# Patient Record
Sex: FEMALE | Race: BLACK OR AFRICAN AMERICAN | NOT HISPANIC OR LATINO | Employment: FULL TIME | URBAN - METROPOLITAN AREA
[De-identification: names, ages, dates, MRNs, and addresses within clinical notes are randomized per-mention and may not be internally consistent; named-entity substitution may affect disease eponyms.]

---

## 2023-05-12 ENCOUNTER — HOSPITAL ENCOUNTER (OUTPATIENT)
Dept: DATA CONVERSION | Facility: HOSPITAL | Age: 48
End: 2023-05-12
Attending: INTERNAL MEDICINE | Admitting: INTERNAL MEDICINE
Payer: COMMERCIAL

## 2023-05-12 DIAGNOSIS — Z12.11 ENCOUNTER FOR SCREENING FOR MALIGNANT NEOPLASM OF COLON: ICD-10-CM

## 2023-05-12 DIAGNOSIS — G43.909 MIGRAINE, UNSPECIFIED, NOT INTRACTABLE, WITHOUT STATUS MIGRAINOSUS: ICD-10-CM

## 2023-05-12 DIAGNOSIS — R10.13 EPIGASTRIC PAIN: ICD-10-CM

## 2023-05-12 DIAGNOSIS — Z91.040 LATEX ALLERGY STATUS: ICD-10-CM

## 2023-05-12 DIAGNOSIS — I10 ESSENTIAL (PRIMARY) HYPERTENSION: ICD-10-CM

## 2023-05-12 DIAGNOSIS — K76.0 FATTY (CHANGE OF) LIVER, NOT ELSEWHERE CLASSIFIED: ICD-10-CM

## 2023-05-12 DIAGNOSIS — K31.9 DISEASE OF STOMACH AND DUODENUM, UNSPECIFIED: ICD-10-CM

## 2023-05-12 DIAGNOSIS — G40.909 EPILEPSY, UNSPECIFIED, NOT INTRACTABLE, WITHOUT STATUS EPILEPTICUS (MULTI): ICD-10-CM

## 2023-05-12 DIAGNOSIS — K57.30 DIVERTICULOSIS OF LARGE INTESTINE WITHOUT PERFORATION OR ABSCESS WITHOUT BLEEDING: ICD-10-CM

## 2023-05-18 LAB
COMPLETE PATHOLOGY REPORT: NORMAL
CONVERTED CLINICAL DIAGNOSIS-HISTORY: NORMAL
CONVERTED FINAL DIAGNOSIS: NORMAL
CONVERTED FINAL REPORT PDF LINK TO COPY AND PASTE: NORMAL
CONVERTED GROSS DESCRIPTION: NORMAL

## 2023-06-14 ENCOUNTER — OFFICE VISIT (OUTPATIENT)
Dept: PRIMARY CARE | Facility: CLINIC | Age: 48
End: 2023-06-14
Payer: COMMERCIAL

## 2023-06-14 ENCOUNTER — APPOINTMENT (OUTPATIENT)
Dept: PRIMARY CARE | Facility: CLINIC | Age: 48
End: 2023-06-14
Payer: COMMERCIAL

## 2023-06-14 VITALS
DIASTOLIC BLOOD PRESSURE: 82 MMHG | TEMPERATURE: 97.9 F | SYSTOLIC BLOOD PRESSURE: 124 MMHG | WEIGHT: 210 LBS | BODY MASS INDEX: 29.71 KG/M2

## 2023-06-14 DIAGNOSIS — Z00.00 ROUTINE GENERAL MEDICAL EXAMINATION AT A HEALTH CARE FACILITY: Primary | ICD-10-CM

## 2023-06-14 DIAGNOSIS — Z14.8 HEMOCHROMATOSIS CARRIER: ICD-10-CM

## 2023-06-14 PROBLEM — K76.0 FATTY INFILTRATION OF LIVER: Status: ACTIVE | Noted: 2023-06-14

## 2023-06-14 PROCEDURE — 1036F TOBACCO NON-USER: CPT | Performed by: INTERNAL MEDICINE

## 2023-06-14 PROCEDURE — 99213 OFFICE O/P EST LOW 20 MIN: CPT | Performed by: INTERNAL MEDICINE

## 2023-06-14 RX ORDER — MINOXIDIL 50 MG/G
AEROSOL, FOAM TOPICAL AS NEEDED
COMMUNITY
Start: 2022-02-04 | End: 2024-05-31 | Stop reason: ALTCHOICE

## 2023-06-14 RX ORDER — CETIRIZINE HYDROCHLORIDE 10 MG/1
10 TABLET ORAL DAILY PRN
COMMUNITY
Start: 2022-12-30 | End: 2024-05-31 | Stop reason: ALTCHOICE

## 2023-06-14 RX ORDER — BIOTIN 1000 MCG
1 TABLET,CHEWABLE ORAL
COMMUNITY
End: 2023-10-16 | Stop reason: WASHOUT

## 2023-06-14 RX ORDER — LEVETIRACETAM 500 MG/1
1 TABLET ORAL 2 TIMES DAILY
COMMUNITY
Start: 2016-09-28

## 2023-06-14 NOTE — PROGRESS NOTES
Subjective   Patient ID: Mirna De La Vega is a 48 y.o. female who presents for 4 month fu.    HPI   Overall well   #1 elevated Cr- no edema.   Good UO    #2 polycythemia- s/p heme eval 2019.    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     #4 fhx DVT- father. s/p heme eval  #5 fatty liver- reviewed. diet/exercise. no EtOH   #6 dyspepsia- mild, low grade.  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      Review of Systems   All other systems reviewed and are negative.      Objective   /82   Temp 36.6 °C (97.9 °F)   Wt 95.3 kg (210 lb)   BMI 29.71 kg/m²     Physical Exam  Constitutional:       Appearance: Normal appearance.   Cardiovascular:      Rate and Rhythm: Normal rate and regular rhythm.      Pulses: Normal pulses.      Heart sounds: Normal heart sounds. No murmur heard.  Pulmonary:      Effort: Pulmonary effort is normal. No respiratory distress.      Breath sounds: Normal breath sounds. No wheezing, rhonchi or rales.   Neurological:      Mental Status: She is alert.   Psychiatric:         Mood and Affect: Mood normal.         Behavior: Behavior normal.         Thought Content: Thought content normal.         Judgment: Judgment normal.       Lab Results   Component Value Date    WBC 6.6 03/17/2022    HGB 14.1 03/17/2022    HCT 46.6 (H) 03/17/2022     03/17/2022    CHOL 154 08/08/2020    TRIG 110 08/08/2020    HDL 49.8 08/08/2020    ALT 18 03/17/2022    AST 19 03/17/2022     03/17/2022    K 3.9 03/17/2022     03/17/2022    CREATININE 1.04 03/17/2022    BUN 12 03/17/2022    CO2 31 03/17/2022    TSH 1.04 03/17/2022     Lab Results   Component Value Date    IRON 52 03/16/2021    TIBC 305 03/16/2021    FERRITIN 47 03/16/2021             Assessment/Plan     #1 elevated Cr-  reviewed.  Stable will  #2 polycythemia- s/p heme eval 2019. 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 retest iron    #4 fhx DVT- father. s/p heme eval  #5 fatty liver- reviewed. diet/exercise. no EtOH   #6 dyspepsia- f/u  GI

## 2023-07-07 ENCOUNTER — OFFICE VISIT (OUTPATIENT)
Dept: PRIMARY CARE | Facility: CLINIC | Age: 48
End: 2023-07-07
Payer: COMMERCIAL

## 2023-07-07 VITALS
TEMPERATURE: 98.1 F | WEIGHT: 210.4 LBS | SYSTOLIC BLOOD PRESSURE: 116 MMHG | BODY MASS INDEX: 29.76 KG/M2 | DIASTOLIC BLOOD PRESSURE: 72 MMHG

## 2023-07-07 DIAGNOSIS — R10.33 PERIUMBILICAL ABDOMINAL PAIN: Primary | ICD-10-CM

## 2023-07-07 PROCEDURE — 99213 OFFICE O/P EST LOW 20 MIN: CPT | Performed by: INTERNAL MEDICINE

## 2023-07-07 PROCEDURE — 1036F TOBACCO NON-USER: CPT | Performed by: INTERNAL MEDICINE
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
## 2023-07-12 ENCOUNTER — OFFICE VISIT (OUTPATIENT)
Dept: PRIMARY CARE | Facility: CLINIC | Age: 48
End: 2023-07-12
Payer: COMMERCIAL

## 2023-07-12 VITALS
TEMPERATURE: 97.3 F | DIASTOLIC BLOOD PRESSURE: 80 MMHG | SYSTOLIC BLOOD PRESSURE: 120 MMHG | WEIGHT: 208 LBS | BODY MASS INDEX: 29.42 KG/M2

## 2023-07-12 DIAGNOSIS — H61.23 IMPACTED CERUMEN OF BOTH EARS: Primary | ICD-10-CM

## 2023-07-12 DIAGNOSIS — H66.90 EAR INFECTION: ICD-10-CM

## 2023-07-12 PROCEDURE — 1036F TOBACCO NON-USER: CPT | Performed by: INTERNAL MEDICINE

## 2023-07-12 PROCEDURE — 69209 REMOVE IMPACTED EAR WAX UNI: CPT | Performed by: INTERNAL MEDICINE

## 2023-07-12 PROCEDURE — 99213 OFFICE O/P EST LOW 20 MIN: CPT | Performed by: INTERNAL MEDICINE

## 2023-07-12 RX ORDER — OFLOXACIN 3 MG/ML
5 SOLUTION AURICULAR (OTIC) 2 TIMES DAILY
Qty: 10 ML | Refills: 0 | Status: SHIPPED | OUTPATIENT
Start: 2023-07-12 | End: 2023-10-17 | Stop reason: ALTCHOICE

## 2023-07-12 ASSESSMENT — PATIENT HEALTH QUESTIONNAIRE - PHQ9
1. LITTLE INTEREST OR PLEASURE IN DOING THINGS: NOT AT ALL
SUM OF ALL RESPONSES TO PHQ9 QUESTIONS 1 AND 2: 0
2. FEELING DOWN, DEPRESSED OR HOPELESS: NOT AT ALL

## 2023-07-12 NOTE — PROGRESS NOTES
Subjective   Patient ID: [id number]   [facility]    Mirna De La Vega is a 48 y.o. female who presents for Ear Fullness (Right ear,decrease in hearing, discomfort noted) and Headache.it started today @4 am. She denies any discharge from the both ears.        Objective   Visit Vitals  /80 (BP Location: Left arm, Patient Position: Sitting, BP Cuff Size: Adult)   Temp 36.3 °C (97.3 °F) (Temporal)      Review of Systems  All 12 systems reviewed, no other abnormality except that mentioned in HPI.    Physical Exam  Constitutional- no abnormality  ENT-impacted cerumen both ears.  Neck- no swelling  CVS- normal S1 and S2, no murmur.  Pulmonary- clear to auscultation,no rhonchi, no wheezes.  Abdomen- normal- liver and spleen, soft, no distension, bowel sound present.  Neurological- all cranial nerves intact, speech and gait normal, no sensory or motor deficiency.  Musculoskeletal- all pulses are normal, normal movement, no joint swelling.  Skin- no rash, dry.  psychiatry- no suicidal ideation.    Lymph node- no lyphadenopathy      Assessment/Plan   Problem List Items Addressed This Visit       Impacted cerumen of both ears - Primary     Irrigation of both ears done.          Other Visit Diagnoses       Ear infection        Relevant Medications    ofloxacin (Floxin) 0.3 % otic solution            Jennifer Benjamin MD

## 2023-07-12 NOTE — PROGRESS NOTES
Patient ID: Mirna De La Vega is a 48 y.o. female.    Ear Cerumen Removal    Date/Time: 7/12/2023 2:27 PM    Performed by: Jennifer Benjamin MD  Authorized by: Jennifer Benjamin MD    Consent:     Consent obtained:  Verbal    Consent given by:  Patient    Risks, benefits, and alternatives were discussed: yes      Risks discussed:  Bleeding, infection and pain    Alternatives discussed:  Delayed treatment  Universal protocol:     Procedure explained and questions answered to patient or proxy's satisfaction: yes      Immediately prior to procedure, a time out was called: yes      Patient identity confirmed:  Verbally with patient  Procedure details:     Location:  L ear and R ear    Procedure type: irrigation      Procedure outcomes: cerumen removed    Post-procedure details:     Inspection:  Ear canal clear    Hearing quality:  Improved    Procedure completion:  Tolerated

## 2023-07-14 ENCOUNTER — TELEPHONE (OUTPATIENT)
Dept: PRIMARY CARE | Facility: CLINIC | Age: 48
End: 2023-07-14
Payer: COMMERCIAL

## 2023-07-27 ENCOUNTER — LAB (OUTPATIENT)
Dept: LAB | Facility: LAB | Age: 48
End: 2023-07-27
Payer: COMMERCIAL

## 2023-07-27 DIAGNOSIS — Z14.8 HEMOCHROMATOSIS CARRIER: ICD-10-CM

## 2023-07-27 DIAGNOSIS — Z00.00 ROUTINE GENERAL MEDICAL EXAMINATION AT A HEALTH CARE FACILITY: ICD-10-CM

## 2023-07-27 LAB
ALANINE AMINOTRANSFERASE (SGPT) (U/L) IN SER/PLAS: 26 U/L (ref 7–45)
ANION GAP IN SER/PLAS: 12 MMOL/L (ref 10–20)
APPEARANCE, URINE: CLEAR
BILIRUBIN, URINE: NEGATIVE
BLOOD, URINE: NEGATIVE
CALCIUM (MG/DL) IN SER/PLAS: 9.3 MG/DL (ref 8.6–10.3)
CARBON DIOXIDE, TOTAL (MMOL/L) IN SER/PLAS: 29 MMOL/L (ref 21–32)
CHLORIDE (MMOL/L) IN SER/PLAS: 102 MMOL/L (ref 98–107)
CHOLESTEROL (MG/DL) IN SER/PLAS: 150 MG/DL (ref 0–199)
CHOLESTEROL IN HDL (MG/DL) IN SER/PLAS: 51.3 MG/DL
CHOLESTEROL/HDL RATIO: 2.9
COLOR, URINE: COLORLESS
CREATININE (MG/DL) IN SER/PLAS: 1.07 MG/DL (ref 0.5–1.05)
ERYTHROCYTE DISTRIBUTION WIDTH (RATIO) BY AUTOMATED COUNT: 14.3 % (ref 11.5–14.5)
ERYTHROCYTE MEAN CORPUSCULAR HEMOGLOBIN CONCENTRATION (G/DL) BY AUTOMATED: 30.6 G/DL (ref 32–36)
ERYTHROCYTE MEAN CORPUSCULAR VOLUME (FL) BY AUTOMATED COUNT: 94 FL (ref 80–100)
ERYTHROCYTES (10*6/UL) IN BLOOD BY AUTOMATED COUNT: 5.1 X10E12/L (ref 4–5.2)
FERRITIN (UG/LL) IN SER/PLAS: 112 UG/L (ref 8–150)
GFR FEMALE: 65 ML/MIN/1.73M2
GLUCOSE (MG/DL) IN SER/PLAS: 68 MG/DL (ref 74–99)
GLUCOSE, URINE: NEGATIVE MG/DL
HEMATOCRIT (%) IN BLOOD BY AUTOMATED COUNT: 48.1 % (ref 36–46)
HEMOGLOBIN (G/DL) IN BLOOD: 14.7 G/DL (ref 12–16)
IRON (UG/DL) IN SER/PLAS: 63 UG/DL (ref 35–150)
IRON BINDING CAPACITY (UG/DL) IN SER/PLAS: 311 UG/DL (ref 240–445)
IRON SATURATION (%) IN SER/PLAS: 20 % (ref 25–45)
KETONES, URINE: NEGATIVE MG/DL
LDL: 79 MG/DL (ref 0–99)
LEUKOCYTE ESTERASE, URINE: NEGATIVE
LEUKOCYTES (10*3/UL) IN BLOOD BY AUTOMATED COUNT: 7.6 X10E9/L (ref 4.4–11.3)
NITRITE, URINE: NEGATIVE
PH, URINE: 7 (ref 5–8)
PLATELETS (10*3/UL) IN BLOOD AUTOMATED COUNT: 320 X10E9/L (ref 150–450)
POTASSIUM (MMOL/L) IN SER/PLAS: 4.1 MMOL/L (ref 3.5–5.3)
PROTEIN, URINE: NEGATIVE MG/DL
SODIUM (MMOL/L) IN SER/PLAS: 139 MMOL/L (ref 136–145)
SPECIFIC GRAVITY, URINE: 1 (ref 1–1.03)
THYROTROPIN (MIU/L) IN SER/PLAS BY DETECTION LIMIT <= 0.05 MIU/L: 1.06 MIU/L (ref 0.44–3.98)
TRIGLYCERIDE (MG/DL) IN SER/PLAS: 100 MG/DL (ref 0–149)
UREA NITROGEN (MG/DL) IN SER/PLAS: 13 MG/DL (ref 6–23)
UROBILINOGEN, URINE: <2 MG/DL (ref 0–1.9)
VLDL: 20 MG/DL (ref 0–40)

## 2023-07-27 PROCEDURE — 83540 ASSAY OF IRON: CPT

## 2023-07-27 PROCEDURE — 80061 LIPID PANEL: CPT

## 2023-07-27 PROCEDURE — 36415 COLL VENOUS BLD VENIPUNCTURE: CPT

## 2023-07-27 PROCEDURE — 80048 BASIC METABOLIC PNL TOTAL CA: CPT

## 2023-07-27 PROCEDURE — 84443 ASSAY THYROID STIM HORMONE: CPT

## 2023-07-27 PROCEDURE — 81003 URINALYSIS AUTO W/O SCOPE: CPT

## 2023-07-27 PROCEDURE — 82728 ASSAY OF FERRITIN: CPT

## 2023-07-27 PROCEDURE — 84460 ALANINE AMINO (ALT) (SGPT): CPT

## 2023-07-27 PROCEDURE — 83036 HEMOGLOBIN GLYCOSYLATED A1C: CPT

## 2023-07-27 PROCEDURE — 83550 IRON BINDING TEST: CPT

## 2023-07-27 PROCEDURE — 85027 COMPLETE CBC AUTOMATED: CPT

## 2023-07-28 LAB
ESTIMATED AVERAGE GLUCOSE FOR HBA1C: 140 MG/DL
HEMOGLOBIN A1C/HEMOGLOBIN TOTAL IN BLOOD: 6.5 %

## 2023-08-08 ENCOUNTER — APPOINTMENT (OUTPATIENT)
Dept: PRIMARY CARE | Facility: CLINIC | Age: 48
End: 2023-08-08
Payer: COMMERCIAL

## 2023-08-23 ENCOUNTER — OFFICE VISIT (OUTPATIENT)
Dept: PRIMARY CARE | Facility: CLINIC | Age: 48
End: 2023-08-23
Payer: COMMERCIAL

## 2023-08-23 VITALS — DIASTOLIC BLOOD PRESSURE: 78 MMHG | SYSTOLIC BLOOD PRESSURE: 124 MMHG | BODY MASS INDEX: 29.42 KG/M2 | WEIGHT: 208 LBS

## 2023-08-23 DIAGNOSIS — K76.0 FATTY INFILTRATION OF LIVER: ICD-10-CM

## 2023-08-23 DIAGNOSIS — E11.9 TYPE 2 DIABETES MELLITUS WITHOUT COMPLICATION, WITHOUT LONG-TERM CURRENT USE OF INSULIN (MULTI): Primary | ICD-10-CM

## 2023-08-23 DIAGNOSIS — Z14.8 HEMOCHROMATOSIS CARRIER: ICD-10-CM

## 2023-08-23 DIAGNOSIS — R10.33 PERIUMBILICAL ABDOMINAL PAIN: ICD-10-CM

## 2023-08-23 PROCEDURE — 3074F SYST BP LT 130 MM HG: CPT | Performed by: INTERNAL MEDICINE

## 2023-08-23 PROCEDURE — 3044F HG A1C LEVEL LT 7.0%: CPT | Performed by: INTERNAL MEDICINE

## 2023-08-23 PROCEDURE — 99214 OFFICE O/P EST MOD 30 MIN: CPT | Performed by: INTERNAL MEDICINE

## 2023-08-23 PROCEDURE — 1036F TOBACCO NON-USER: CPT | Performed by: INTERNAL MEDICINE

## 2023-08-23 PROCEDURE — 3078F DIAST BP <80 MM HG: CPT | Performed by: INTERNAL MEDICINE

## 2023-08-23 ASSESSMENT — PATIENT HEALTH QUESTIONNAIRE - PHQ9
2. FEELING DOWN, DEPRESSED OR HOPELESS: SEVERAL DAYS
SUM OF ALL RESPONSES TO PHQ9 QUESTIONS 1 AND 2: 2
1. LITTLE INTEREST OR PLEASURE IN DOING THINGS: SEVERAL DAYS

## 2023-08-23 NOTE — PROGRESS NOTES
Subjective   Patient ID: Mirna De La Vega is a 48 y.o. female who presents for Follow-up (Blood work).    HPI   Overall well.  Note dated 6/14/2023 reviewed.  Abdominal pain completely resolved.  #1 elevated creatinine-no NSAIDs.  Good fluid intake  #2 polycythemia  #3 heterozygote HHC  #4 family history of DVT  #5 fatty liver-little exercise.  Diet fair.  No alcohol.  #6 impaired fasting blood sugar-trying to reduce sugar and carbs.  Weight stable.  Little exercise.    Review of Systems  All systems reviewed and negative except as per history of present illness  Objective   /78   Wt 94.3 kg (208 lb)   BMI 29.42 kg/m²     Physical Exam  Constitutional:       Appearance: Normal appearance.   Cardiovascular:      Rate and Rhythm: Normal rate and regular rhythm.      Pulses: Normal pulses.      Heart sounds: Normal heart sounds. No murmur heard.  Pulmonary:      Effort: Pulmonary effort is normal. No respiratory distress.      Breath sounds: Normal breath sounds. No wheezing, rhonchi or rales.   Neurological:      Mental Status: She is alert.   Psychiatric:         Mood and Affect: Mood normal.         Behavior: Behavior normal.         Thought Content: Thought content normal.         Judgment: Judgment normal.       Lab Results   Component Value Date    WBC 7.6 07/27/2023    HGB 14.7 07/27/2023    HCT 48.1 (H) 07/27/2023     07/27/2023    CHOL 150 07/27/2023    TRIG 100 07/27/2023    HDL 51.3 07/27/2023    ALT 26 07/27/2023    AST 19 03/17/2022     07/27/2023    K 4.1 07/27/2023     07/27/2023    CREATININE 1.07 (H) 07/27/2023    BUN 13 07/27/2023    CO2 29 07/27/2023    TSH 1.06 07/27/2023    HGBA1C 6.5 (A) 07/27/2023     === 07/07/23 ===    US PELVIS APPENDIX    - Impression -  Unremarkable ultrasound of the periumbilical region.     Assessment/Plan       #1 elevated Cr-  reviewed.  Stable.  Reviewed no NSAIDs.  Blood pressure controlled.  Sugar control.  Repeat urine albumin.  Renal  ultrasound unremarkable.  Consider ACE/ARB.    #2 polycythemia- s/p heme eval 2019. repeat labs    #3 heteroz HHC- s/p heme eval. retest iron next visit  #4 fhx DVT- father. s/p heme eval  #5 fatty liver- reviewed. diet/exercise. no EtOH   #6 dyspepsia- f/u  GI  #7 IFBS-reviewed at great length.  At this point consistent with diabetes.  We discussed this at length.  Eye exam, reviewed.  Recommend nutrition consult.  Follow-up blood work 3 months.  Spent significant time discussing diet and exercise.  Recommend beginning metformin, she would like to try 3 months of lifestyle prior.  #8 h/o [id number] neurology

## 2023-09-07 VITALS — HEIGHT: 69 IN | BODY MASS INDEX: 29.71 KG/M2 | WEIGHT: 200.62 LBS

## 2023-09-13 LAB
CHLAMYDIA TRACH., AMPLIFIED: NEGATIVE
N. GONORRHEA, AMPLIFIED: NEGATIVE
TRICHOMONAS VAGINALIS: NEGATIVE

## 2023-09-20 ENCOUNTER — TELEPHONE (OUTPATIENT)
Dept: PRIMARY CARE | Facility: CLINIC | Age: 48
End: 2023-09-20
Payer: COMMERCIAL

## 2023-09-22 ENCOUNTER — TELEPHONE (OUTPATIENT)
Dept: PRIMARY CARE | Facility: CLINIC | Age: 48
End: 2023-09-22
Payer: COMMERCIAL

## 2023-09-22 LAB
ALBUMIN (G/DL) IN SER/PLAS: 4.4 G/DL (ref 3.4–5)
ALBUMIN (MG/L) IN URINE: <7 MG/L
ALBUMIN/CREATININE (UG/MG) IN URINE: NORMAL UG/MG CRT (ref 0–30)
ANION GAP IN SER/PLAS: 12 MMOL/L (ref 10–20)
APPEARANCE, URINE: CLEAR
BILIRUBIN, URINE: NEGATIVE
BLOOD, URINE: NEGATIVE
CALCIUM (MG/DL) IN SER/PLAS: 9.8 MG/DL (ref 8.6–10.6)
CARBON DIOXIDE, TOTAL (MMOL/L) IN SER/PLAS: 31 MMOL/L (ref 21–32)
CHLORIDE (MMOL/L) IN SER/PLAS: 105 MMOL/L (ref 98–107)
COLOR, URINE: YELLOW
CREATININE (MG/DL) IN SER/PLAS: 1.26 MG/DL (ref 0.5–1.05)
CREATININE (MG/DL) IN URINE: 136 MG/DL (ref 20–320)
CREATININE (MG/DL) IN URINE: 136 MG/DL (ref 20–320)
ERYTHROCYTE DISTRIBUTION WIDTH (RATIO) BY AUTOMATED COUNT: 13.9 % (ref 11.5–14.5)
ERYTHROCYTE MEAN CORPUSCULAR HEMOGLOBIN CONCENTRATION (G/DL) BY AUTOMATED: 32 G/DL (ref 32–36)
ERYTHROCYTE MEAN CORPUSCULAR VOLUME (FL) BY AUTOMATED COUNT: 94 FL (ref 80–100)
ERYTHROCYTES (10*6/UL) IN BLOOD BY AUTOMATED COUNT: 5.16 X10E12/L (ref 4–5.2)
GFR FEMALE: 53 ML/MIN/1.73M2
GLUCOSE (MG/DL) IN SER/PLAS: 46 MG/DL (ref 74–99)
GLUCOSE, URINE: NEGATIVE MG/DL
HEMATOCRIT (%) IN BLOOD BY AUTOMATED COUNT: 48.4 % (ref 36–46)
HEMOGLOBIN (G/DL) IN BLOOD: 15.5 G/DL (ref 12–16)
HEPATITIS B VIRUS SURFACE AB (MIU/ML) IN SERUM: 83 MIU/ML
KETONES, URINE: NEGATIVE MG/DL
LEUKOCYTE ESTERASE, URINE: NEGATIVE
LEUKOCYTES (10*3/UL) IN BLOOD BY AUTOMATED COUNT: 7.5 X10E9/L (ref 4.4–11.3)
NITRITE, URINE: NEGATIVE
NRBC (PER 100 WBCS) BY AUTOMATED COUNT: 0 /100 WBC (ref 0–0)
PH, URINE: 6 (ref 5–8)
PHOSPHATE (MG/DL) IN SER/PLAS: 3.4 MG/DL (ref 2.5–4.9)
PLATELETS (10*3/UL) IN BLOOD AUTOMATED COUNT: 289 X10E9/L (ref 150–450)
POTASSIUM (MMOL/L) IN SER/PLAS: 4.1 MMOL/L (ref 3.5–5.3)
PROTEIN (MG/DL) IN URINE: 9 MG/DL (ref 5–24)
PROTEIN, URINE: NEGATIVE MG/DL
PROTEIN/CREATININE (MG/MG) IN URINE: 0.07 MG/MG CREAT (ref 0–0.17)
SODIUM (MMOL/L) IN SER/PLAS: 144 MMOL/L (ref 136–145)
SPECIFIC GRAVITY, URINE: 1.01 (ref 1–1.03)
UREA NITROGEN (MG/DL) IN SER/PLAS: 13 MG/DL (ref 6–23)
UROBILINOGEN, URINE: <2 MG/DL (ref 0–1.9)

## 2023-09-22 NOTE — TELEPHONE ENCOUNTER
Pt left a msg wondeirng if the Keppra that she is on, could be causing her kidney levels  to be off a little bit.

## 2023-09-24 LAB
IMMUNOGLOBULIN LIGHT CHAINS KAPPA/LAMBDA (MASS RATIO) IN SERUM: 1.9 (ref 0.26–1.65)
IMMUNOGLOBULIN LIGHT CHAINS.KAPPA (MG/DL) IN SERUM: 2.15 MG/DL (ref 0.33–1.94)
IMMUNOGLOBULIN LIGHT CHAINS.LAMBDA (MG/DL) IN SERUM: 1.13 MG/DL (ref 0.57–2.63)

## 2023-09-25 ENCOUNTER — HOSPITAL ENCOUNTER (OUTPATIENT)
Facility: HOSPITAL | Age: 48
Setting detail: OUTPATIENT SURGERY
End: 2023-09-25
Attending: OBSTETRICS & GYNECOLOGY | Admitting: OBSTETRICS & GYNECOLOGY
Payer: COMMERCIAL

## 2023-09-26 LAB
ALBUMIN ELP: 4.2 G/DL (ref 3.4–5)
ALPHA 1: 0.3 G/DL (ref 0.2–0.6)
ALPHA 2: 0.7 G/DL (ref 0.4–1.1)
BETA: 0.8 G/DL (ref 0.5–1.2)
GAMMA GLOBULIN: 1.1 G/DL (ref 0.5–1.4)
PATH REVIEW - SERUM IMMUNOFIXATION: NORMAL
PATH REVIEW-SERUM PROTEIN ELECTROPHORESIS: NORMAL
PROTEIN ELECTROPHORESIS INTERPRETATION: NORMAL
PROTEIN TOTAL: 7.1 G/DL (ref 6.4–8.2)
SERUM IMMUNOFIXATION INTERPRETATION: NORMAL

## 2023-09-28 ENCOUNTER — TELEPHONE (OUTPATIENT)
Dept: PRIMARY CARE | Facility: CLINIC | Age: 48
End: 2023-09-28
Payer: COMMERCIAL

## 2023-09-28 NOTE — TELEPHONE ENCOUNTER
Pt left a msg asking for a suggestion for a Calcium supplement.  She stopped her Women's Once a day due to the iron, and she is only taking the Biotin 10,000 mg.  She doesn't eat dairy as she is very lactose intolerant.

## 2023-10-03 ENCOUNTER — TELEPHONE (OUTPATIENT)
Dept: OBSTETRICS AND GYNECOLOGY | Facility: CLINIC | Age: 48
End: 2023-10-03
Payer: COMMERCIAL

## 2023-10-06 ENCOUNTER — OFFICE VISIT (OUTPATIENT)
Dept: PRIMARY CARE | Facility: CLINIC | Age: 48
End: 2023-10-06
Payer: COMMERCIAL

## 2023-10-06 VITALS — WEIGHT: 203 LBS | BODY MASS INDEX: 30 KG/M2 | SYSTOLIC BLOOD PRESSURE: 110 MMHG | DIASTOLIC BLOOD PRESSURE: 70 MMHG

## 2023-10-06 DIAGNOSIS — M54.2 NECK PAIN: ICD-10-CM
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
## 2023-10-06 DIAGNOSIS — M25.569 KNEE PAIN, UNSPECIFIED CHRONICITY, UNSPECIFIED LATERALITY: ICD-10-CM

## 2023-10-06 DIAGNOSIS — M79.602 PAIN IN BOTH UPPER EXTREMITIES: ICD-10-CM

## 2023-10-06 DIAGNOSIS — M79.601 PAIN IN BOTH UPPER EXTREMITIES: ICD-10-CM

## 2023-10-06 PROCEDURE — 99213 OFFICE O/P EST LOW 20 MIN: CPT | Performed by: INTERNAL MEDICINE

## 2023-10-06 PROCEDURE — 1036F TOBACCO NON-USER: CPT | Performed by: INTERNAL MEDICINE

## 2023-10-06 RX ORDER — CYCLOBENZAPRINE HCL 5 MG
5 TABLET ORAL 2 TIMES DAILY PRN
Qty: 20 TABLET | Refills: 0 | Status: SHIPPED | OUTPATIENT
Start: 2023-10-06 | End: 2023-11-07

## 2023-10-06 ASSESSMENT — PATIENT HEALTH QUESTIONNAIRE - PHQ9
SUM OF ALL RESPONSES TO PHQ9 QUESTIONS 1 AND 2: 0
2. FEELING DOWN, DEPRESSED OR HOPELESS: NOT AT ALL
1. LITTLE INTEREST OR PLEASURE IN DOING THINGS: NOT AT ALL
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
## 2023-10-09 ENCOUNTER — TELEPHONE (OUTPATIENT)
Dept: PRIMARY CARE | Facility: CLINIC | Age: 48
End: 2023-10-09
Payer: COMMERCIAL
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
## 2023-10-16 PROBLEM — E83.19 IRON OVERLOAD: Status: ACTIVE | Noted: 2023-10-16

## 2023-10-16 PROBLEM — M54.32 LEFT SIDED SCIATICA: Status: ACTIVE | Noted: 2023-10-16

## 2023-10-16 PROBLEM — M54.50 CHRONIC LOW BACK PAIN: Status: ACTIVE | Noted: 2023-10-16

## 2023-10-16 PROBLEM — L30.9 DERMATITIS, ECZEMATOID: Status: ACTIVE | Noted: 2023-10-16

## 2023-10-16 PROBLEM — R51.9 HEADACHE: Status: ACTIVE | Noted: 2023-10-16

## 2023-10-16 PROBLEM — G93.89 BRAIN MASS: Status: ACTIVE | Noted: 2023-10-16

## 2023-10-16 PROBLEM — L02.91 ABSCESS: Status: RESOLVED | Noted: 2023-10-16 | Resolved: 2023-10-16

## 2023-10-16 PROBLEM — R07.89 ATYPICAL CHEST PAIN: Status: ACTIVE | Noted: 2023-10-16

## 2023-10-16 PROBLEM — G89.29 CHRONIC LOW BACK PAIN: Status: ACTIVE | Noted: 2023-10-16

## 2023-10-16 PROBLEM — L70.0 CYSTIC ACNE: Status: ACTIVE | Noted: 2023-10-16

## 2023-10-16 PROBLEM — R11.0 NAUSEATED: Status: RESOLVED | Noted: 2023-10-16 | Resolved: 2023-10-16

## 2023-10-16 PROBLEM — R10.13 EPIGASTRIC PAIN: Status: ACTIVE | Noted: 2023-10-16

## 2023-10-16 PROBLEM — L23.9 ALLERGIC DERMATITIS: Status: ACTIVE | Noted: 2023-10-16

## 2023-10-16 PROBLEM — M25.562 LEFT KNEE PAIN: Status: ACTIVE | Noted: 2023-10-16

## 2023-10-16 PROBLEM — D32.9 BENIGN MENINGIOMA (MULTI): Status: ACTIVE | Noted: 2019-10-17

## 2023-10-16 PROBLEM — L65.9 HAIR LOSS: Status: ACTIVE | Noted: 2023-10-16

## 2023-10-16 PROBLEM — R10.13 DYSPEPSIA: Status: ACTIVE | Noted: 2023-10-16

## 2023-10-16 PROBLEM — R92.8 ABNORMAL MAMMOGRAM: Status: ACTIVE | Noted: 2023-10-16

## 2023-10-16 PROBLEM — L03.019 PARONYCHIA, FINGER: Status: RESOLVED | Noted: 2023-10-16 | Resolved: 2023-10-16

## 2023-10-16 PROBLEM — R56.9 SEIZURES (MULTI): Status: ACTIVE | Noted: 2023-10-16

## 2023-10-16 PROBLEM — M54.2 NECK PAIN, ACUTE: Status: ACTIVE | Noted: 2023-10-16

## 2023-10-16 PROBLEM — N28.9 RENAL INSUFFICIENCY: Status: ACTIVE | Noted: 2023-10-16

## 2023-10-16 PROBLEM — K92.1 HEMATOCHEZIA: Status: ACTIVE | Noted: 2023-10-16

## 2023-10-16 PROBLEM — G40.209 PARTIAL SYMPTOMATIC EPILEPSY WITH COMPLEX PARTIAL SEIZURES, NOT INTRACTABLE, WITHOUT STATUS EPILEPTICUS (MULTI): Status: ACTIVE | Noted: 2020-10-30

## 2023-10-16 PROBLEM — K64.9 HEMORRHOIDS: Status: ACTIVE | Noted: 2023-10-16

## 2023-10-16 PROBLEM — R68.81 EARLY SATIETY: Status: ACTIVE | Noted: 2023-10-16

## 2023-10-16 PROBLEM — R63.5 WEIGHT GAIN: Status: ACTIVE | Noted: 2023-10-16

## 2023-10-16 PROBLEM — D75.1 POLYCYTHEMIA: Status: ACTIVE | Noted: 2023-10-16

## 2023-10-16 PROBLEM — B37.9 YEAST INFECTION: Status: RESOLVED | Noted: 2023-10-16 | Resolved: 2023-10-16

## 2023-10-17 ENCOUNTER — TELEMEDICINE (OUTPATIENT)
Dept: PRIMARY CARE | Facility: CLINIC | Age: 48
End: 2023-10-17
Payer: COMMERCIAL

## 2023-10-17 DIAGNOSIS — R71.8 ELEVATED HEMATOCRIT: Primary | ICD-10-CM

## 2023-10-17 PROCEDURE — 99213 OFFICE O/P EST LOW 20 MIN: CPT | Performed by: NURSE PRACTITIONER

## 2023-10-17 ASSESSMENT — ENCOUNTER SYMPTOMS
CONSTITUTIONAL NEGATIVE: 1
CARDIOVASCULAR NEGATIVE: 1
RESPIRATORY NEGATIVE: 1

## 2023-10-17 NOTE — PROGRESS NOTES
Subjective   Patient ID: Mirna De La Vega is a 48 y.o. female who presents for Follow-up.    HPI Presents today virtually for follow up on labs and request to see hematology  Saw nephrology for elevated creatine.  Took her off biotin and multivitamin  Father histroy of blood clots and  of acute leukemia  Brother hemachromatosis, she is carrier  Brother has adenosacroma and being trreatment  now  Review of Systems   Constitutional: Negative.    Respiratory: Negative.     Cardiovascular: Negative.        Objective   There were no vitals taken for this visit.    Physical Exam  Constitutional:       Appearance: Normal appearance. She is normal weight.   Pulmonary:      Effort: Pulmonary effort is normal.   Neurological:      Mental Status: She is alert.   Psychiatric:         Mood and Affect: Mood normal.         Behavior: Behavior normal.         Thought Content: Thought content normal.         Judgment: Judgment normal.         Assessment/Plan   Problem List Items Addressed This Visit    None  Visit Diagnoses         Codes    Elevated hematocrit    -  Primary R71.8    Relevant Orders    Referral to Hematology and Oncology

## 2023-10-24 ENCOUNTER — OFFICE VISIT (OUTPATIENT)
Dept: PRIMARY CARE | Facility: CLINIC | Age: 48
End: 2023-10-24
Payer: COMMERCIAL

## 2023-10-24 ENCOUNTER — TELEPHONE (OUTPATIENT)
Dept: SCHEDULING | Age: 48
End: 2023-10-24

## 2023-10-24 VITALS
TEMPERATURE: 97.2 F | DIASTOLIC BLOOD PRESSURE: 98 MMHG | HEART RATE: 83 BPM | BODY MASS INDEX: 30.12 KG/M2 | SYSTOLIC BLOOD PRESSURE: 122 MMHG | OXYGEN SATURATION: 100 % | WEIGHT: 203.8 LBS

## 2023-10-24 DIAGNOSIS — M54.2 NECK PAIN: Primary | ICD-10-CM

## 2023-10-24 DIAGNOSIS — M25.569 KNEE PAIN, UNSPECIFIED CHRONICITY, UNSPECIFIED LATERALITY: ICD-10-CM
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
## 2023-10-24 PROCEDURE — 99213 OFFICE O/P EST LOW 20 MIN: CPT | Performed by: INTERNAL MEDICINE

## 2023-10-24 PROCEDURE — 1036F TOBACCO NON-USER: CPT | Performed by: INTERNAL MEDICINE

## 2023-10-24 ASSESSMENT — PAIN SCALES - GENERAL: PAINLEVEL: 6

## 2023-10-24 NOTE — TELEPHONE ENCOUNTER
Left voicemail for pt to cancel appointment with Dr. Suh. Scheduled incorrectly. Encouraged pt to call us back to schedule with another provider.
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
## 2023-10-31 ENCOUNTER — APPOINTMENT (OUTPATIENT)
Dept: ORTHOPEDIC SURGERY | Facility: CLINIC | Age: 48
End: 2023-10-31
Payer: COMMERCIAL

## 2023-11-06 DIAGNOSIS — M79.602 PAIN IN BOTH UPPER EXTREMITIES: ICD-10-CM
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
## 2023-11-06 DIAGNOSIS — M25.569 KNEE PAIN, UNSPECIFIED CHRONICITY, UNSPECIFIED LATERALITY: ICD-10-CM

## 2023-11-06 DIAGNOSIS — M79.601 PAIN IN BOTH UPPER EXTREMITIES: ICD-10-CM

## 2023-11-06 DIAGNOSIS — M54.2 NECK PAIN: ICD-10-CM

## 2023-11-07 RX ORDER — CYCLOBENZAPRINE HCL 5 MG
5 TABLET ORAL 2 TIMES DAILY PRN
Qty: 20 TABLET | Refills: 0 | Status: SHIPPED | OUTPATIENT
Start: 2023-11-07 | End: 2023-11-27

## 2023-11-13 ENCOUNTER — ANCILLARY PROCEDURE (OUTPATIENT)
Dept: RADIOLOGY | Facility: CLINIC | Age: 48
End: 2023-11-13
Payer: COMMERCIAL

## 2023-11-13 ENCOUNTER — OFFICE VISIT (OUTPATIENT)
Dept: ORTHOPEDIC SURGERY | Facility: CLINIC | Age: 48
End: 2023-11-13
Payer: COMMERCIAL

## 2023-11-13 DIAGNOSIS — M54.50 LUMBAR PAIN: ICD-10-CM

## 2023-11-13 DIAGNOSIS — M25.569 KNEE PAIN, UNSPECIFIED CHRONICITY, UNSPECIFIED LATERALITY: ICD-10-CM

## 2023-11-13 DIAGNOSIS — M54.2 NECK PAIN: Primary | ICD-10-CM
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
## 2023-11-13 DIAGNOSIS — M54.2 NECK PAIN: ICD-10-CM

## 2023-11-13 PROCEDURE — 1036F TOBACCO NON-USER: CPT

## 2023-11-13 PROCEDURE — 72110 X-RAY EXAM L-2 SPINE 4/>VWS: CPT | Performed by: STUDENT IN AN ORGANIZED HEALTH CARE EDUCATION/TRAINING PROGRAM

## 2023-11-13 PROCEDURE — 72110 X-RAY EXAM L-2 SPINE 4/>VWS: CPT | Mod: FY

## 2023-11-13 PROCEDURE — 99203 OFFICE O/P NEW LOW 30 MIN: CPT

## 2023-11-13 PROCEDURE — 72050 X-RAY EXAM NECK SPINE 4/5VWS: CPT

## 2023-11-13 PROCEDURE — 99213 OFFICE O/P EST LOW 20 MIN: CPT

## 2023-11-13 PROCEDURE — 72050 X-RAY EXAM NECK SPINE 4/5VWS: CPT | Performed by: STUDENT IN AN ORGANIZED HEALTH CARE EDUCATION/TRAINING PROGRAM
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
## 2023-11-21 ENCOUNTER — APPOINTMENT (OUTPATIENT)
Dept: HEMATOLOGY/ONCOLOGY | Facility: HOSPITAL | Age: 48
End: 2023-11-21
Payer: COMMERCIAL

## 2023-11-21 ENCOUNTER — APPOINTMENT (OUTPATIENT)
Dept: OBSTETRICS AND GYNECOLOGY | Facility: CLINIC | Age: 48
End: 2023-11-21
Payer: COMMERCIAL

## 2023-11-27 ENCOUNTER — APPOINTMENT (OUTPATIENT)
Dept: PRIMARY CARE | Facility: CLINIC | Age: 48
End: 2023-11-27
Payer: COMMERCIAL

## 2023-12-14 ENCOUNTER — APPOINTMENT (OUTPATIENT)
Dept: PRIMARY CARE | Facility: CLINIC | Age: 48
End: 2023-12-14
Payer: COMMERCIAL

## 2023-12-18 ENCOUNTER — APPOINTMENT (OUTPATIENT)
Dept: RADIOLOGY | Facility: CLINIC | Age: 48
End: 2023-12-18
Payer: COMMERCIAL

## 2023-12-18 ENCOUNTER — ANCILLARY PROCEDURE (OUTPATIENT)
Dept: RADIOLOGY | Facility: CLINIC | Age: 48
End: 2023-12-18
Payer: COMMERCIAL

## 2023-12-18 ENCOUNTER — OFFICE VISIT (OUTPATIENT)
Dept: HEMATOLOGY/ONCOLOGY | Facility: CLINIC | Age: 48
End: 2023-12-18
Payer: COMMERCIAL

## 2023-12-18 VITALS
BODY MASS INDEX: 30.84 KG/M2 | HEIGHT: 68 IN | HEART RATE: 94 BPM | RESPIRATION RATE: 18 BRPM | SYSTOLIC BLOOD PRESSURE: 125 MMHG | WEIGHT: 203.48 LBS | DIASTOLIC BLOOD PRESSURE: 74 MMHG | OXYGEN SATURATION: 100 % | TEMPERATURE: 97.2 F

## 2023-12-18 DIAGNOSIS — R71.8 ELEVATED HEMATOCRIT: ICD-10-CM

## 2023-12-18 PROCEDURE — 99204 OFFICE O/P NEW MOD 45 MIN: CPT | Performed by: INTERNAL MEDICINE

## 2023-12-18 PROCEDURE — 1036F TOBACCO NON-USER: CPT | Performed by: INTERNAL MEDICINE

## 2023-12-18 PROCEDURE — 77075 RADEX OSSEOUS SURVEY COMPL: CPT | Performed by: RADIOLOGY

## 2023-12-18 PROCEDURE — 99214 OFFICE O/P EST MOD 30 MIN: CPT | Performed by: INTERNAL MEDICINE

## 2023-12-18 PROCEDURE — 77075 RADEX OSSEOUS SURVEY COMPL: CPT

## 2023-12-18 ASSESSMENT — PAIN SCALES - GENERAL: PAINLEVEL: 0-NO PAIN

## 2023-12-18 NOTE — PROGRESS NOTES
Patient ID: Mirna De La Vega is a 48 y.o. female.  Referring Physician: Sofi Nation, APRN-CNP  [address]  [facility], [address]  [place] [address]  Primary Care Provider: Ludivina Luis MD  Visit Type: Initial Visit  The patient was referred to me for further evaluation and management of elevated free kappa light chains at 2.16 mg/dL reference range 0.33 to 1.94 mg/dL.  Serum protein electrophoresis did not reveal M protein.  Subjective    HPI  The patient is a 48-year-old woman with past medical history of meningioma, s/p resection, epilepsy, elevated creatinine.  The patient is asymptomatic.  She was evaluated for elevated creatinine at 1.26 mg/dL and hyperglycemia.  As a part of evaluation she was noted to have elevated free kappa light chains and was referred for further evaluation and management.    At interview on 2023 the patient denied history of weight loss, fevers, night sweats, bone pains, constipation, confusion, chest pain, shortness of breath, nausea, vomiting, hematemesis, melena, hematochezia and hematuria.    Past medical history:    History of meningioma s/p resection, history of epilepsy, well-controlled, history of carrier/mutation for hemochromatosis the patient does not recall details.    Past surgical history:    History of , hernia repair, left frontal lobe resection for meningioma, status postcraniotomy.    Family history:    Mother had eczema diabetes, father had lipidemia and thyroid cancer.  Brother has anxiety depression and astrocytoma.  Maternal uncle had alcohol dependence.    Personal history and social history:    48 years old,  has 1 daughter Works at [employer].  Denies history of smoking alcohol abuse drug abuse.    Review of Systems   All other systems reviewed and are negative.       Objective   BSA: 2.11 meters squared  /74 (BP Location: Right arm, Patient Position: Sitting, BP Cuff Size: Large adult long)    "Pulse 94   Temp 36.2 °C (97.2 °F) (Temporal)   Resp 18   Ht (S) 1.73 m (5' 8.11\")   Wt 92.3 kg (203 lb 7.8 oz)   SpO2 100%   BMI 30.84 kg/m²      has a past medical history of Carbuncle, unspecified (2013), Epidermal cyst (2013), Essential (primary) hypertension (2013), Gross hematuria (2013), Other fatigue (2013), Paronychia, finger (10/16/2023), and Personal history of other specified conditions (10/16/2018).   has a past surgical history that includes Hernia repair (2018); Other surgical history (2016); and  section, low transverse.  Family History   Problem Relation Name Age of Onset    Diabetes Mother      Hyperlipidemia Mother      Deep vein thrombosis Father      Diabetes Father      Hyperlipidemia Father      Leukemia Father      Other (thymus cancer) Father      Myelodysplastic syndrome Father      Other (astrocytoma) Brother      Hemochromatosis Brother       Oncology History    No history exists.       Mirna De La Vega  reports that she has never smoked. She has never used smokeless tobacco.  She  reports no history of alcohol use.  She  reports no history of drug use.    Physical Exam  Constitutional:       Appearance: Normal appearance.   HENT:      Head: Normocephalic and atraumatic.      Nose: Nose normal.      Mouth/Throat:      Mouth: Mucous membranes are moist.      Pharynx: Oropharynx is clear.   Eyes:      Extraocular Movements: Extraocular movements intact.      Conjunctiva/sclera: Conjunctivae normal.      Pupils: Pupils are equal, round, and reactive to light.   Cardiovascular:      Rate and Rhythm: Normal rate and regular rhythm.   Pulmonary:      Effort: Pulmonary effort is normal.      Breath sounds: Normal breath sounds.   Abdominal:      General: Abdomen is flat. Bowel sounds are normal.      Palpations: Abdomen is soft.   Musculoskeletal:         General: Normal range of motion.      Cervical back: Normal range of motion and neck " "supple.   Neurological:      General: No focal deficit present.      Mental Status: She is alert and oriented to person, place, and time. Mental status is at baseline.   Psychiatric:         Mood and Affect: Mood normal.         Behavior: Behavior normal.         Thought Content: Thought content normal.         Judgment: Judgment normal.         WBC   Date/Time Value Ref Range Status   09/22/2023 10:16 AM 7.5 4.4 - 11.3 x10E9/L Final   07/27/2023 12:48 PM 7.6 4.4 - 11.3 x10E9/L Final   03/17/2022 09:43 AM 6.6 4.4 - 11.3 x10E9/L Final     nRBC   Date Value Ref Range Status   09/22/2023 0.0 0.0 - 0.0 /100 WBC Final   03/16/2021 0.0 0.0 - 0.0 /100 WBC Final   08/08/2020 0.0 0.0 - 0.0 /100 WBC Final     RBC   Date Value Ref Range Status   09/22/2023 5.16 4.00 - 5.20 x10E12/L Final   07/27/2023 5.10 4.00 - 5.20 x10E12/L Final   03/17/2022 4.91 4.00 - 5.20 x10E12/L Final     Hemoglobin   Date Value Ref Range Status   09/22/2023 15.5 12.0 - 16.0 g/dL Final   07/27/2023 14.7 12.0 - 16.0 g/dL Final   03/17/2022 14.1 12.0 - 16.0 g/dL Final     Hematocrit   Date Value Ref Range Status   09/22/2023 48.4 (H) 36.0 - 46.0 % Final   07/27/2023 48.1 (H) 36.0 - 46.0 % Final   03/17/2022 46.6 (H) 36.0 - 46.0 % Final     MCV   Date/Time Value Ref Range Status   09/22/2023 10:16 AM 94 80 - 100 fL Final   07/27/2023 12:48 PM 94 80 - 100 fL Final   03/17/2022 09:43 AM 95 80 - 100 fL Final     No results found for: \"MCH\"  MCHC   Date/Time Value Ref Range Status   09/22/2023 10:16 AM 32.0 32.0 - 36.0 g/dL Final   07/27/2023 12:48 PM 30.6 (L) 32.0 - 36.0 g/dL Final   03/17/2022 09:43 AM 30.3 (L) 32.0 - 36.0 g/dL Final     RDW   Date/Time Value Ref Range Status   09/22/2023 10:16 AM 13.9 11.5 - 14.5 % Final   07/27/2023 12:48 PM 14.3 11.5 - 14.5 % Final   03/17/2022 09:43 AM 13.9 11.5 - 14.5 % Final     Platelets   Date/Time Value Ref Range Status   09/22/2023 10:16  150 - 450 x10E9/L Final   07/27/2023 12:48  150 - 450 x10E9/L " "Final   03/17/2022 09:43  150 - 450 x10E9/L Final     No results found for: \"MPV\"  Neutrophils %   Date/Time Value Ref Range Status   03/17/2022 09:43 AM 74.1 40.0 - 80.0 % Final   03/16/2021 10:58 AM 77.9 40.0 - 80.0 % Final   08/08/2020 09:12 AM 79.3 40.0 - 80.0 % Final     Immature Granulocytes %, Automated   Date/Time Value Ref Range Status   03/17/2022 09:43 AM 0.3 0.0 - 0.9 % Final     Comment:      Immature Granulocyte Count (IG) includes promyelocytes,    myelocytes and metamyelocytes but does not include bands.   Percent differential counts (%) should be interpreted in the   context of the absolute cell counts (cells/L).     03/16/2021 10:58 AM 0.5 0.0 - 0.9 % Final     Comment:      Immature Granulocyte Count (IG) includes promyelocytes,    myelocytes and metamyelocytes but does not include bands.   Percent differential counts (%) should be interpreted in the   context of the absolute cell counts (cells/L).     08/08/2020 09:12 AM 0.9 0.0 - 0.9 % Final     Comment:      Immature Granulocyte Count (IG) includes promyelocytes,    myelocytes and metamyelocytes but does not include bands.   Percent differential counts (%) should be interpreted in the   context of the absolute cell counts (cells/L).       Lymphocytes %   Date/Time Value Ref Range Status   03/17/2022 09:43 AM 16.3 13.0 - 44.0 % Final   03/16/2021 10:58 AM 14.1 13.0 - 44.0 % Final   08/08/2020 09:12 AM 12.3 13.0 - 44.0 % Final     Monocytes %   Date/Time Value Ref Range Status   03/17/2022 09:43 AM 8.6 2.0 - 10.0 % Final   03/16/2021 10:58 AM 6.9 2.0 - 10.0 % Final   08/08/2020 09:12 AM 7.0 2.0 - 10.0 % Final     Eosinophils %   Date/Time Value Ref Range Status   03/17/2022 09:43 AM 0.2 0.0 - 6.0 % Final   03/16/2021 10:58 AM 0.1 0.0 - 6.0 % Final   08/08/2020 09:12 AM 0.2 0.0 - 6.0 % Final     Basophils %   Date/Time Value Ref Range Status   03/17/2022 09:43 AM 0.5 0.0 - 2.0 % Final   03/16/2021 10:58 AM 0.5 0.0 - 2.0 % Final   08/08/2020 " "09:12 AM 0.3 0.0 - 2.0 % Final     Neutrophils Absolute   Date/Time Value Ref Range Status   03/17/2022 09:43 AM 4.90 1.20 - 7.70 x10E9/L Final   03/16/2021 10:58 AM 6.59 1.20 - 7.70 x10E9/L Final   08/08/2020 09:12 AM 7.82 (H) 1.20 - 7.70 x10E9/L Final     No results found for: \"IGABSOL\"  Lymphocytes Absolute   Date/Time Value Ref Range Status   03/17/2022 09:43 AM 1.08 (L) 1.20 - 4.80 x10E9/L Final   03/16/2021 10:58 AM 1.19 (L) 1.20 - 4.80 x10E9/L Final   08/08/2020 09:12 AM 1.21 1.20 - 4.80 x10E9/L Final     Monocytes Absolute   Date/Time Value Ref Range Status   03/17/2022 09:43 AM 0.57 0.10 - 1.00 x10E9/L Final   03/16/2021 10:58 AM 0.58 0.10 - 1.00 x10E9/L Final   08/08/2020 09:12 AM 0.69 0.10 - 1.00 x10E9/L Final     Eosinophils Absolute   Date/Time Value Ref Range Status   03/17/2022 09:43 AM 0.01 0.00 - 0.70 x10E9/L Final   03/16/2021 10:58 AM 0.01 0.00 - 0.70 x10E9/L Final   08/08/2020 09:12 AM 0.02 0.00 - 0.70 x10E9/L Final     Basophils Absolute   Date/Time Value Ref Range Status   03/17/2022 09:43 AM 0.03 0.00 - 0.10 x10E9/L Final   03/16/2021 10:58 AM 0.04 0.00 - 0.10 x10E9/L Final   08/08/2020 09:12 AM 0.03 0.00 - 0.10 x10E9/L Final       No components found for: \"PT\"  No results found for: \"APTT\"    Assessment/Plan    The patient is a 48-year-old woman with past medical history of meningioma, s/p resection, epilepsy, elevated creatinine.  The patient is asymptomatic.  She was evaluated for elevated creatinine at 1.26 mg/dL and hyperglycemia.  As a part of evaluation she was noted to have elevated free kappa light chains and was referred for further evaluation and management.  Physical examination was within normal limits.  Reviewed lab data with the patient.  Elevated free kappa light chain occurs in less than 1% of population below the age of 60.  I have recommended a bone survey.  If there are no osteolytic lesions that pretty much rules out multiple myeloma.  However, to be certain 1 could " consider a bone marrow aspiration and biopsy.  The patient would like to think about it.  There are no therapeutic recommendations today.  Thank you for allowing me to participate in care of your patient if you have any questions please feel free to call me.     Diagnoses and all orders for this visit:  Elevated hematocrit  -     Referral to Hematology and Oncology  -     Clinic Appointment Request; Future         Jarvis Manzano MD

## 2024-01-05 ENCOUNTER — APPOINTMENT (OUTPATIENT)
Dept: PRIMARY CARE | Facility: CLINIC | Age: 49
End: 2024-01-05
Payer: COMMERCIAL

## 2024-01-16 ENCOUNTER — APPOINTMENT (OUTPATIENT)
Dept: HEMATOLOGY/ONCOLOGY | Facility: CLINIC | Age: 49
End: 2024-01-16
Payer: COMMERCIAL

## 2024-01-17 ENCOUNTER — LAB (OUTPATIENT)
Dept: LAB | Facility: LAB | Age: 49
End: 2024-01-17
Payer: COMMERCIAL

## 2024-01-17 DIAGNOSIS — E11.9 TYPE 2 DIABETES MELLITUS WITHOUT COMPLICATION, WITHOUT LONG-TERM CURRENT USE OF INSULIN (MULTI): ICD-10-CM

## 2024-01-17 LAB
ALT SERPL W P-5'-P-CCNC: 21 U/L (ref 7–45)
ANION GAP SERPL CALC-SCNC: 14 MMOL/L (ref 10–20)
BUN SERPL-MCNC: 16 MG/DL (ref 6–23)
CALCIUM SERPL-MCNC: 9.4 MG/DL (ref 8.6–10.3)
CHLORIDE SERPL-SCNC: 102 MMOL/L (ref 98–107)
CHOLEST SERPL-MCNC: 166 MG/DL (ref 0–199)
CHOLESTEROL/HDL RATIO: 2.9
CO2 SERPL-SCNC: 27 MMOL/L (ref 21–32)
CREAT SERPL-MCNC: 1.35 MG/DL (ref 0.5–1.05)
CREAT UR-MCNC: 69.5 MG/DL (ref 20–320)
EGFRCR SERPLBLD CKD-EPI 2021: 49 ML/MIN/1.73M*2
EST. AVERAGE GLUCOSE BLD GHB EST-MCNC: 137 MG/DL
GLUCOSE SERPL-MCNC: 71 MG/DL (ref 74–99)
HBA1C MFR BLD: 6.4 %
HDLC SERPL-MCNC: 57.5 MG/DL
LDLC SERPL CALC-MCNC: 96 MG/DL
MICROALBUMIN UR-MCNC: <7 MG/L
MICROALBUMIN/CREAT UR: NORMAL MG/G{CREAT}
NON HDL CHOLESTEROL: 109 MG/DL (ref 0–149)
POTASSIUM SERPL-SCNC: 4.3 MMOL/L (ref 3.5–5.3)
SODIUM SERPL-SCNC: 139 MMOL/L (ref 136–145)
TRIGL SERPL-MCNC: 63 MG/DL (ref 0–149)
VLDL: 13 MG/DL (ref 0–40)

## 2024-01-17 PROCEDURE — 84460 ALANINE AMINO (ALT) (SGPT): CPT

## 2024-01-17 PROCEDURE — 82570 ASSAY OF URINE CREATININE: CPT

## 2024-01-17 PROCEDURE — 36415 COLL VENOUS BLD VENIPUNCTURE: CPT

## 2024-01-17 PROCEDURE — 83036 HEMOGLOBIN GLYCOSYLATED A1C: CPT

## 2024-01-17 PROCEDURE — 80061 LIPID PANEL: CPT

## 2024-01-17 PROCEDURE — 82043 UR ALBUMIN QUANTITATIVE: CPT

## 2024-01-17 PROCEDURE — 80048 BASIC METABOLIC PNL TOTAL CA: CPT

## 2024-01-22 ENCOUNTER — OFFICE VISIT (OUTPATIENT)
Dept: PRIMARY CARE | Facility: CLINIC | Age: 49
End: 2024-01-22
Payer: COMMERCIAL

## 2024-01-22 VITALS
SYSTOLIC BLOOD PRESSURE: 130 MMHG | WEIGHT: 206.4 LBS | BODY MASS INDEX: 30.57 KG/M2 | OXYGEN SATURATION: 99 % | TEMPERATURE: 98 F | DIASTOLIC BLOOD PRESSURE: 80 MMHG | HEIGHT: 69 IN | HEART RATE: 76 BPM

## 2024-01-22 DIAGNOSIS — D32.9 BENIGN MENINGIOMA (MULTI): ICD-10-CM

## 2024-01-22 DIAGNOSIS — E11.9 TYPE 2 DIABETES MELLITUS WITHOUT COMPLICATION, WITHOUT LONG-TERM CURRENT USE OF INSULIN (MULTI): Primary | ICD-10-CM

## 2024-01-22 DIAGNOSIS — G40.209 PARTIAL SYMPTOMATIC EPILEPSY WITH COMPLEX PARTIAL SEIZURES, NOT INTRACTABLE, WITHOUT STATUS EPILEPTICUS (MULTI): ICD-10-CM

## 2024-01-22 PROCEDURE — 3062F POS MACROALBUMINURIA REV: CPT | Performed by: INTERNAL MEDICINE

## 2024-01-22 PROCEDURE — 3048F LDL-C <100 MG/DL: CPT | Performed by: INTERNAL MEDICINE

## 2024-01-22 PROCEDURE — 3079F DIAST BP 80-89 MM HG: CPT | Performed by: INTERNAL MEDICINE

## 2024-01-22 PROCEDURE — 3075F SYST BP GE 130 - 139MM HG: CPT | Performed by: INTERNAL MEDICINE

## 2024-01-22 PROCEDURE — 1036F TOBACCO NON-USER: CPT | Performed by: INTERNAL MEDICINE

## 2024-01-22 PROCEDURE — 99214 OFFICE O/P EST MOD 30 MIN: CPT | Performed by: INTERNAL MEDICINE

## 2024-01-22 PROCEDURE — 3044F HG A1C LEVEL LT 7.0%: CPT | Performed by: INTERNAL MEDICINE

## 2024-01-22 ASSESSMENT — PATIENT HEALTH QUESTIONNAIRE - PHQ9
2. FEELING DOWN, DEPRESSED OR HOPELESS: NOT AT ALL
1. LITTLE INTEREST OR PLEASURE IN DOING THINGS: NOT AT ALL
SUM OF ALL RESPONSES TO PHQ9 QUESTIONS 1 AND 2: 0

## 2024-01-22 NOTE — PROGRESS NOTES
"Subjective   Patient ID: Mirna De La Vega is a 48 y.o. female who presents for Med Refill and Follow-up (Lab results).    Med Refill     Overall well.    #1 elevated creatinine-no NSAIDs.  Good fluid intake  #2 polycythemia  #3 heterozygote HHC  #4 family history of DVT  #5 fatty liver-little exercise.  Diet fair.  No alcohol.  #6 impaired fasting blood sugar-trying to reduce sugar and carbs.  Weight stable.  Little exercise.    Review of Systems  All systems reviewed and negative except as per history of present illness  Objective   /80 (BP Location: Right arm, Patient Position: Sitting, BP Cuff Size: Adult)   Pulse 76   Temp 36.7 °C (98 °F) (Temporal)   Ht 1.745 m (5' 8.7\")   Wt 93.6 kg (206 lb 6.4 oz)   SpO2 99%   BMI 30.75 kg/m²     Physical Exam  Constitutional:       Appearance: Normal appearance.   Cardiovascular:      Rate and Rhythm: Normal rate and regular rhythm.      Pulses: Normal pulses.      Heart sounds: Normal heart sounds. No murmur heard.  Pulmonary:      Effort: Pulmonary effort is normal. No respiratory distress.      Breath sounds: Normal breath sounds. No wheezing, rhonchi or rales.   Neurological:      Mental Status: She is alert.   Psychiatric:         Mood and Affect: Mood normal.         Behavior: Behavior normal.         Thought Content: Thought content normal.         Judgment: Judgment normal.     Lab Results   Component Value Date    WBC 7.5 09/22/2023    HGB 15.5 09/22/2023    HCT 48.4 (H) 09/22/2023     09/22/2023    CHOL 166 01/17/2024    TRIG 63 01/17/2024    HDL 57.5 01/17/2024    ALT 21 01/17/2024    AST 19 03/17/2022     01/17/2024    K 4.3 01/17/2024     01/17/2024    CREATININE 1.35 (H) 01/17/2024    BUN 16 01/17/2024    CO2 27 01/17/2024    TSH 1.06 07/27/2023    HGBA1C 6.4 (H) 01/17/2024     === 07/07/23 ===    US PELVIS APPENDIX    - Impression -  Unremarkable ultrasound of the periumbilical region.     Assessment/Plan       #1 elevated " Cr/ckd-  reviewed.  Stable.  Reviewed no NSAIDs.  Blood pressure controlled.  Sugar control.  Retest. f/u  nephrology  #2 polycythemia- s/p heme    #3 heteroz HHC- s/p heme eval. retest iron next visit  #4 fhx DVT- father. s/p heme eval  #5 fatty liver- reviewed. diet/exercise. no EtOH   #6 dyspepsia- f/u  GI  #7 IFBS-reviewed at great length.  Stable.  We discussed this at length.  Eye exam, reviewed.  Recommend nutrition consult.  Follow-up blood work 3 months.  Spent significant time discussing diet and exercise.  Recommend beginning metformin, she would like to try 3-4 months of lifestyle prior.  #8 h/o [id number] neurology  #9 light-chain- f/u  heme.

## 2024-01-24 ENCOUNTER — TELEPHONE (OUTPATIENT)
Dept: PRIMARY CARE | Facility: CLINIC | Age: 49
End: 2024-01-24
Payer: COMMERCIAL

## 2024-01-31 ENCOUNTER — OFFICE VISIT (OUTPATIENT)
Dept: HEMATOLOGY/ONCOLOGY | Facility: CLINIC | Age: 49
End: 2024-01-31
Payer: COMMERCIAL

## 2024-01-31 VITALS
RESPIRATION RATE: 18 BRPM | BODY MASS INDEX: 31.24 KG/M2 | WEIGHT: 206.13 LBS | SYSTOLIC BLOOD PRESSURE: 122 MMHG | HEIGHT: 68 IN | TEMPERATURE: 97.3 F | HEART RATE: 77 BPM | DIASTOLIC BLOOD PRESSURE: 72 MMHG | OXYGEN SATURATION: 99 %

## 2024-01-31 DIAGNOSIS — R71.8 ELEVATED HEMATOCRIT: ICD-10-CM

## 2024-01-31 DIAGNOSIS — Z14.8 HEMOCHROMATOSIS CARRIER: Primary | ICD-10-CM

## 2024-01-31 PROCEDURE — 3078F DIAST BP <80 MM HG: CPT | Performed by: INTERNAL MEDICINE

## 2024-01-31 PROCEDURE — 99215 OFFICE O/P EST HI 40 MIN: CPT | Performed by: INTERNAL MEDICINE

## 2024-01-31 PROCEDURE — 3044F HG A1C LEVEL LT 7.0%: CPT | Performed by: INTERNAL MEDICINE

## 2024-01-31 PROCEDURE — 3074F SYST BP LT 130 MM HG: CPT | Performed by: INTERNAL MEDICINE

## 2024-01-31 PROCEDURE — 3048F LDL-C <100 MG/DL: CPT | Performed by: INTERNAL MEDICINE

## 2024-01-31 PROCEDURE — 1036F TOBACCO NON-USER: CPT | Performed by: INTERNAL MEDICINE

## 2024-01-31 PROCEDURE — 3062F POS MACROALBUMINURIA REV: CPT | Performed by: INTERNAL MEDICINE

## 2024-01-31 ASSESSMENT — PAIN SCALES - GENERAL: PAINLEVEL: 0-NO PAIN

## 2024-01-31 NOTE — PROGRESS NOTES
Patient ID: Mirna De La Vega is a 48 y.o. female.  Referring Physician: Jarvis Manzano MD  [address] [facility], [place] [address]  Primary Care Provider: Ludivina Luis MD  Visit Type: Initial Visit  The patient was referred to me for further evaluation and management of elevated free kappa light chains at 2.16 mg/dL reference range 0.33 to 1.94 mg/dL.  Serum protein electrophoresis did not reveal M protein.  Bone survey did not reveal any osteolytic lesions.  MGUS.    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  H63D was not detected.  Subjective    HPI  The patient is a 48-year-old woman with past medical history of meningioma, s/p resection, epilepsy, elevated creatinine.  The patient is asymptomatic.  She was evaluated for elevated creatinine at 1.26 mg/dL and hyperglycemia.  As a part of evaluation she was noted to have elevated free kappa light chains and was referred for further evaluation and management.    At interview on 2023 the patient denied history of weight loss, fevers, night sweats, bone pains, constipation, confusion, chest pain, shortness of breath, nausea, vomiting, hematemesis, melena, hematochezia and hematuria.    The patient had come for follow-up on 2024 regarding history of elevated free kappa light chains and heterozygosity for C2 84 Y hereditary hemochromatosis gene mutation.  The patient is asymptomatic.    Past medical history:    History of meningioma s/p resection, history of epilepsy, well-controlled, history of carrier/mutation for hemochromatosis the patient does not recall details.    Past surgical history:    History of , hernia repair, left frontal lobe resection for meningioma, status postcraniotomy.    Family history:    Mother had eczema diabetes, father had lipidemia and thyroid cancer.  Brother has anxiety depression and astrocytoma.  Maternal uncle had alcohol dependence.    Personal history and social  "history:    48 years old,  has 1 daughter Works at [employer].  Denies history of smoking alcohol abuse drug abuse.    Review of Systems   All other systems reviewed and are negative.       Objective   BSA: 2.12 meters squared  /72 (BP Location: Right arm, Patient Position: Sitting, BP Cuff Size: Adult)   Pulse 77   Temp 36.3 °C (97.3 °F) (Temporal)   Resp 18   Ht 1.73 m (5' 8.11\")   Wt 93.5 kg (206 lb 2.1 oz)   SpO2 99%   BMI 31.24 kg/m²      has a past medical history of Carbuncle, unspecified (2013), Epidermal cyst (2013), Essential (primary) hypertension (2013), Gross hematuria (2013), Other fatigue (2013), Paronychia, finger (10/16/2023), and Personal history of other specified conditions (10/16/2018).   has a past surgical history that includes Hernia repair (2018); Other surgical history (2016); and  section, low transverse.  Family History   Problem Relation Name Age of Onset    Diabetes Mother      Hyperlipidemia Mother      Deep vein thrombosis Father      Diabetes Father      Hyperlipidemia Father      Leukemia Father      Other (thymus cancer) Father      Myelodysplastic syndrome Father      Other (astrocytoma) Brother      Hemochromatosis Brother       Oncology History    No history exists.       Mirna De La Vega  reports that she has never smoked. She has never used smokeless tobacco.  She  reports no history of alcohol use.  She  reports no history of drug use.    Physical Exam  Constitutional:       Appearance: Normal appearance.   HENT:      Head: Normocephalic and atraumatic.      Nose: Nose normal.      Mouth/Throat:      Mouth: Mucous membranes are moist.      Pharynx: Oropharynx is clear.   Eyes:      Extraocular Movements: Extraocular movements intact.      Conjunctiva/sclera: Conjunctivae normal.      Pupils: Pupils are equal, round, and reactive to light.   Cardiovascular:      Rate and Rhythm: Normal rate and " "regular rhythm.   Pulmonary:      Effort: Pulmonary effort is normal.      Breath sounds: Normal breath sounds.   Abdominal:      General: Abdomen is flat. Bowel sounds are normal.      Palpations: Abdomen is soft.   Musculoskeletal:         General: Normal range of motion.      Cervical back: Normal range of motion and neck supple.   Neurological:      General: No focal deficit present.      Mental Status: She is alert and oriented to person, place, and time. Mental status is at baseline.   Psychiatric:         Mood and Affect: Mood normal.         Behavior: Behavior normal.         Thought Content: Thought content normal.         Judgment: Judgment normal.         WBC   Date/Time Value Ref Range Status   09/22/2023 10:16 AM 7.5 4.4 - 11.3 x10E9/L Final   07/27/2023 12:48 PM 7.6 4.4 - 11.3 x10E9/L Final   03/17/2022 09:43 AM 6.6 4.4 - 11.3 x10E9/L Final     nRBC   Date Value Ref Range Status   09/22/2023 0.0 0.0 - 0.0 /100 WBC Final   03/16/2021 0.0 0.0 - 0.0 /100 WBC Final   08/08/2020 0.0 0.0 - 0.0 /100 WBC Final     RBC   Date Value Ref Range Status   09/22/2023 5.16 4.00 - 5.20 x10E12/L Final   07/27/2023 5.10 4.00 - 5.20 x10E12/L Final   03/17/2022 4.91 4.00 - 5.20 x10E12/L Final     Hemoglobin   Date Value Ref Range Status   09/22/2023 15.5 12.0 - 16.0 g/dL Final   07/27/2023 14.7 12.0 - 16.0 g/dL Final   03/17/2022 14.1 12.0 - 16.0 g/dL Final     Hematocrit   Date Value Ref Range Status   09/22/2023 48.4 (H) 36.0 - 46.0 % Final   07/27/2023 48.1 (H) 36.0 - 46.0 % Final   03/17/2022 46.6 (H) 36.0 - 46.0 % Final     MCV   Date/Time Value Ref Range Status   09/22/2023 10:16 AM 94 80 - 100 fL Final   07/27/2023 12:48 PM 94 80 - 100 fL Final   03/17/2022 09:43 AM 95 80 - 100 fL Final     No results found for: \"MCH\"  MCHC   Date/Time Value Ref Range Status   09/22/2023 10:16 AM 32.0 32.0 - 36.0 g/dL Final   07/27/2023 12:48 PM 30.6 (L) 32.0 - 36.0 g/dL Final   03/17/2022 09:43 AM 30.3 (L) 32.0 - 36.0 g/dL Final " "    RDW   Date/Time Value Ref Range Status   09/22/2023 10:16 AM 13.9 11.5 - 14.5 % Final   07/27/2023 12:48 PM 14.3 11.5 - 14.5 % Final   03/17/2022 09:43 AM 13.9 11.5 - 14.5 % Final     Platelets   Date/Time Value Ref Range Status   09/22/2023 10:16  150 - 450 x10E9/L Final   07/27/2023 12:48  150 - 450 x10E9/L Final   03/17/2022 09:43  150 - 450 x10E9/L Final     No results found for: \"MPV\"  Neutrophils %   Date/Time Value Ref Range Status   03/17/2022 09:43 AM 74.1 40.0 - 80.0 % Final   03/16/2021 10:58 AM 77.9 40.0 - 80.0 % Final   08/08/2020 09:12 AM 79.3 40.0 - 80.0 % Final     Immature Granulocytes %, Automated   Date/Time Value Ref Range Status   03/17/2022 09:43 AM 0.3 0.0 - 0.9 % Final     Comment:      Immature Granulocyte Count (IG) includes promyelocytes,    myelocytes and metamyelocytes but does not include bands.   Percent differential counts (%) should be interpreted in the   context of the absolute cell counts (cells/L).     03/16/2021 10:58 AM 0.5 0.0 - 0.9 % Final     Comment:      Immature Granulocyte Count (IG) includes promyelocytes,    myelocytes and metamyelocytes but does not include bands.   Percent differential counts (%) should be interpreted in the   context of the absolute cell counts (cells/L).     08/08/2020 09:12 AM 0.9 0.0 - 0.9 % Final     Comment:      Immature Granulocyte Count (IG) includes promyelocytes,    myelocytes and metamyelocytes but does not include bands.   Percent differential counts (%) should be interpreted in the   context of the absolute cell counts (cells/L).       Lymphocytes %   Date/Time Value Ref Range Status   03/17/2022 09:43 AM 16.3 13.0 - 44.0 % Final   03/16/2021 10:58 AM 14.1 13.0 - 44.0 % Final   08/08/2020 09:12 AM 12.3 13.0 - 44.0 % Final     Monocytes %   Date/Time Value Ref Range Status   03/17/2022 09:43 AM 8.6 2.0 - 10.0 % Final   03/16/2021 10:58 AM 6.9 2.0 - 10.0 % Final   08/08/2020 09:12 AM 7.0 2.0 - 10.0 % Final " "    Eosinophils %   Date/Time Value Ref Range Status   03/17/2022 09:43 AM 0.2 0.0 - 6.0 % Final   03/16/2021 10:58 AM 0.1 0.0 - 6.0 % Final   08/08/2020 09:12 AM 0.2 0.0 - 6.0 % Final     Basophils %   Date/Time Value Ref Range Status   03/17/2022 09:43 AM 0.5 0.0 - 2.0 % Final   03/16/2021 10:58 AM 0.5 0.0 - 2.0 % Final   08/08/2020 09:12 AM 0.3 0.0 - 2.0 % Final     Neutrophils Absolute   Date/Time Value Ref Range Status   03/17/2022 09:43 AM 4.90 1.20 - 7.70 x10E9/L Final   03/16/2021 10:58 AM 6.59 1.20 - 7.70 x10E9/L Final   08/08/2020 09:12 AM 7.82 (H) 1.20 - 7.70 x10E9/L Final     No results found for: \"IGABSOL\"  Lymphocytes Absolute   Date/Time Value Ref Range Status   03/17/2022 09:43 AM 1.08 (L) 1.20 - 4.80 x10E9/L Final   03/16/2021 10:58 AM 1.19 (L) 1.20 - 4.80 x10E9/L Final   08/08/2020 09:12 AM 1.21 1.20 - 4.80 x10E9/L Final     Monocytes Absolute   Date/Time Value Ref Range Status   03/17/2022 09:43 AM 0.57 0.10 - 1.00 x10E9/L Final   03/16/2021 10:58 AM 0.58 0.10 - 1.00 x10E9/L Final   08/08/2020 09:12 AM 0.69 0.10 - 1.00 x10E9/L Final     Eosinophils Absolute   Date/Time Value Ref Range Status   03/17/2022 09:43 AM 0.01 0.00 - 0.70 x10E9/L Final   03/16/2021 10:58 AM 0.01 0.00 - 0.70 x10E9/L Final   08/08/2020 09:12 AM 0.02 0.00 - 0.70 x10E9/L Final     Basophils Absolute   Date/Time Value Ref Range Status   03/17/2022 09:43 AM 0.03 0.00 - 0.10 x10E9/L Final   03/16/2021 10:58 AM 0.04 0.00 - 0.10 x10E9/L Final   08/08/2020 09:12 AM 0.03 0.00 - 0.10 x10E9/L Final       No components found for: \"PT\"  No results found for: \"APTT\"    Assessment/Plan    The patient is a 48-year-old woman with past medical history of meningioma, s/p resection, epilepsy, elevated creatinine.  The patient is asymptomatic.  She was evaluated for elevated creatinine at 1.26 mg/dL and hyperglycemia.  As a part of evaluation she was noted to have elevated free kappa light chains and was referred for further evaluation and " management.  Physical examination was within normal limits.  Reviewed lab data with the patient.  Elevated free kappa light chain occurs in less than 1% of population below the age of 60.  I have recommended a bone survey.  If there are no osteolytic lesions that pretty much rules out multiple myeloma.  However, to be certain 1 could consider a bone marrow aspiration and biopsy.  The patient would like to think about it.  There are no therapeutic recommendations today.  Thank you for allowing me to participate in care of your patient if you have any questions please feel free to call me.    Heterozygous c.845G>A (p.C282Y) mutation in HFE gene was detected but negative for H63D mutation:    Discussed in detail with the patient.  The patient is currently premenopausal and has regular.'s.  If desires could consider blood donation every 56 days.  If not consider therapeutic phlebotomies every 4 weeks to maintain hemoglobin at or under 15 g/dL.  Siblings and children need to be tested for the same.    Elevated free kappa light chains 2.14 mg/dL, bone survey negative/MGUS.    To be followed clinically.    Return in 6 months.     Diagnoses and all orders for this visit:  Elevated hematocrit  -     Referral to Hematology and Oncology  -     Clinic Appointment Request; Future         Jarvis Manzano MD

## 2024-02-02 ENCOUNTER — TELEPHONE (OUTPATIENT)
Dept: HEMATOLOGY/ONCOLOGY | Facility: CLINIC | Age: 49
End: 2024-02-02
Payer: COMMERCIAL

## 2024-02-02 DIAGNOSIS — D75.1 POLYCYTHEMIA: ICD-10-CM

## 2024-02-02 NOTE — TELEPHONE ENCOUNTER
Patient called that she tried to donate blood to [facility] but they asked a lot of questions and it seemed to be more complicated than donating blood. Patient wants to get it done at one of our infusion centers. Patient lives in the [place] area and [facility] is close to her house and work. Will talk to Dr Manzano about phlebotomy frequency and placing orders. Will coordinate appts for patient with [facility] and will call patient when done.

## 2024-02-05 DIAGNOSIS — Z14.8 HEMOCHROMATOSIS CARRIER: Primary | ICD-10-CM

## 2024-02-05 DIAGNOSIS — E83.19 IRON OVERLOAD: ICD-10-CM

## 2024-02-05 RX ORDER — FAMOTIDINE 10 MG/ML
20 INJECTION INTRAVENOUS ONCE AS NEEDED
Status: CANCELLED | OUTPATIENT
Start: 2024-02-06

## 2024-02-05 RX ORDER — ALBUTEROL SULFATE 0.83 MG/ML
3 SOLUTION RESPIRATORY (INHALATION) AS NEEDED
Status: CANCELLED | OUTPATIENT
Start: 2024-02-06

## 2024-02-05 RX ORDER — EPINEPHRINE 0.3 MG/.3ML
0.3 INJECTION SUBCUTANEOUS EVERY 5 MIN PRN
Status: CANCELLED | OUTPATIENT
Start: 2024-02-06

## 2024-02-05 RX ORDER — DIPHENHYDRAMINE HYDROCHLORIDE 50 MG/ML
50 INJECTION INTRAMUSCULAR; INTRAVENOUS AS NEEDED
Status: CANCELLED | OUTPATIENT
Start: 2024-02-06

## 2024-02-05 RX ORDER — HEPARIN 100 UNIT/ML
500 SYRINGE INTRAVENOUS AS NEEDED
Status: CANCELLED | OUTPATIENT
Start: 2024-02-05

## 2024-02-05 RX ORDER — HEPARIN SODIUM,PORCINE/PF 10 UNIT/ML
50 SYRINGE (ML) INTRAVENOUS AS NEEDED
Status: CANCELLED | OUTPATIENT
Start: 2024-02-05

## 2024-02-06 ENCOUNTER — TELEPHONE (OUTPATIENT)
Dept: PRIMARY CARE | Facility: CLINIC | Age: 49
End: 2024-02-06
Payer: COMMERCIAL

## 2024-02-06 NOTE — TELEPHONE ENCOUNTER
Patient call, left msg, wanting to know how you feel about probiotics?  She said she feels fine, that her digestive is changing as she is getting older.  The only other supplement she takes is Calcium Citrate w/VitD3.      If you recommend trying probiotic, which one do you recommend?      She said we could call her back and leave a detailed message, ([phone]) or if easier, send message through [facility].

## 2024-02-07 NOTE — TELEPHONE ENCOUNTER
Called patient to schedule her for phlebotomy. She is available to go on 2/23/24 at 2 pm at [facility] in [place]. Orders placed in EPIC to schedule patient. Mirna aware to draw CBC-D this week to check her hemoglobin level before the appt.

## 2024-02-08 ENCOUNTER — LAB (OUTPATIENT)
Dept: LAB | Facility: LAB | Age: 49
End: 2024-02-08
Payer: COMMERCIAL

## 2024-02-08 DIAGNOSIS — Z14.8 HEMOCHROMATOSIS CARRIER: ICD-10-CM

## 2024-02-08 DIAGNOSIS — R71.8 ELEVATED HEMATOCRIT: ICD-10-CM

## 2024-02-08 PROCEDURE — 84165 PROTEIN E-PHORESIS SERUM: CPT

## 2024-02-08 PROCEDURE — 82728 ASSAY OF FERRITIN: CPT

## 2024-02-08 PROCEDURE — 83550 IRON BINDING TEST: CPT

## 2024-02-08 PROCEDURE — 83540 ASSAY OF IRON: CPT

## 2024-02-08 PROCEDURE — 85025 COMPLETE CBC W/AUTO DIFF WBC: CPT

## 2024-02-08 PROCEDURE — 36415 COLL VENOUS BLD VENIPUNCTURE: CPT

## 2024-02-08 PROCEDURE — 86320 SERUM IMMUNOELECTROPHORESIS: CPT | Performed by: INTERNAL MEDICINE

## 2024-02-08 PROCEDURE — 83521 IG LIGHT CHAINS FREE EACH: CPT

## 2024-02-08 PROCEDURE — 86334 IMMUNOFIX E-PHORESIS SERUM: CPT

## 2024-02-08 PROCEDURE — 84165 PROTEIN E-PHORESIS SERUM: CPT | Performed by: INTERNAL MEDICINE

## 2024-02-08 PROCEDURE — 84155 ASSAY OF PROTEIN SERUM: CPT

## 2024-02-08 PROCEDURE — 82105 ALPHA-FETOPROTEIN SERUM: CPT

## 2024-02-08 PROCEDURE — 80053 COMPREHEN METABOLIC PANEL: CPT

## 2024-02-09 LAB
AFP SERPL-MCNC: <4 NG/ML (ref 0–9)
ALBUMIN SERPL BCP-MCNC: 4.4 G/DL (ref 3.4–5)
ALP SERPL-CCNC: 56 U/L (ref 33–110)
ALT SERPL W P-5'-P-CCNC: 20 U/L (ref 7–45)
ANION GAP SERPL CALC-SCNC: 14 MMOL/L (ref 10–20)
AST SERPL W P-5'-P-CCNC: 19 U/L (ref 9–39)
BASOPHILS # BLD AUTO: 0.05 X10*3/UL (ref 0–0.1)
BASOPHILS NFR BLD AUTO: 0.6 %
BILIRUB SERPL-MCNC: 0.4 MG/DL (ref 0–1.2)
BUN SERPL-MCNC: 18 MG/DL (ref 6–23)
CALCIUM SERPL-MCNC: 9.6 MG/DL (ref 8.6–10.6)
CHLORIDE SERPL-SCNC: 103 MMOL/L (ref 98–107)
CO2 SERPL-SCNC: 27 MMOL/L (ref 21–32)
CREAT SERPL-MCNC: 1.36 MG/DL (ref 0.5–1.05)
EGFRCR SERPLBLD CKD-EPI 2021: 49 ML/MIN/1.73M*2
EOSINOPHIL # BLD AUTO: 0.06 X10*3/UL (ref 0–0.7)
EOSINOPHIL NFR BLD AUTO: 0.7 %
ERYTHROCYTE [DISTWIDTH] IN BLOOD BY AUTOMATED COUNT: 14.3 % (ref 11.5–14.5)
FERRITIN SERPL-MCNC: 107 NG/ML (ref 8–150)
GLUCOSE SERPL-MCNC: 78 MG/DL (ref 74–99)
HCT VFR BLD AUTO: 47.7 % (ref 36–46)
HGB BLD-MCNC: 15 G/DL (ref 12–16)
IMM GRANULOCYTES # BLD AUTO: 0.04 X10*3/UL (ref 0–0.7)
IMM GRANULOCYTES NFR BLD AUTO: 0.5 % (ref 0–0.9)
IRON SATN MFR SERPL: 19 % (ref 25–45)
IRON SERPL-MCNC: 56 UG/DL (ref 35–150)
KAPPA LC SERPL-MCNC: 1.92 MG/DL (ref 0.33–1.94)
KAPPA LC/LAMBDA SER: 2.04 {RATIO} (ref 0.26–1.65)
LAMBDA LC SERPL-MCNC: 0.94 MG/DL (ref 0.57–2.63)
LYMPHOCYTES # BLD AUTO: 1.1 X10*3/UL (ref 1.2–4.8)
LYMPHOCYTES NFR BLD AUTO: 12.6 %
MCH RBC QN AUTO: 29.4 PG (ref 26–34)
MCHC RBC AUTO-ENTMCNC: 31.4 G/DL (ref 32–36)
MCV RBC AUTO: 94 FL (ref 80–100)
MONOCYTES # BLD AUTO: 0.7 X10*3/UL (ref 0.1–1)
MONOCYTES NFR BLD AUTO: 8 %
NEUTROPHILS # BLD AUTO: 6.81 X10*3/UL (ref 1.2–7.7)
NEUTROPHILS NFR BLD AUTO: 77.6 %
NRBC BLD-RTO: 0 /100 WBCS (ref 0–0)
PLATELET # BLD AUTO: 282 X10*3/UL (ref 150–450)
POTASSIUM SERPL-SCNC: 4.1 MMOL/L (ref 3.5–5.3)
PROT SERPL-MCNC: 6.9 G/DL (ref 6.4–8.2)
PROT SERPL-MCNC: 6.9 G/DL (ref 6.4–8.2)
RBC # BLD AUTO: 5.1 X10*6/UL (ref 4–5.2)
SODIUM SERPL-SCNC: 140 MMOL/L (ref 136–145)
TIBC SERPL-MCNC: 301 UG/DL (ref 240–445)
UIBC SERPL-MCNC: 245 UG/DL (ref 110–370)
WBC # BLD AUTO: 8.8 X10*3/UL (ref 4.4–11.3)

## 2024-02-13 LAB
ALBUMIN: 4.1 G/DL (ref 3.4–5)
ALPHA 1 GLOBULIN: 0.3 G/DL (ref 0.2–0.6)
ALPHA 2 GLOBULIN: 0.7 G/DL (ref 0.4–1.1)
BETA GLOBULIN: 0.7 G/DL (ref 0.5–1.2)
GAMMA GLOBULIN: 1 G/DL (ref 0.5–1.4)
IMMUNOFIXATION COMMENT: NORMAL
PATH REVIEW - SERUM IMMUNOFIXATION: NORMAL
PATH REVIEW-SERUM PROTEIN ELECTROPHORESIS: NORMAL
PROTEIN ELECTROPHORESIS COMMENT: NORMAL

## 2024-02-23 ENCOUNTER — INFUSION (OUTPATIENT)
Dept: HEMATOLOGY/ONCOLOGY | Facility: CLINIC | Age: 49
End: 2024-02-23
Payer: COMMERCIAL

## 2024-02-23 VITALS
WEIGHT: 205.25 LBS | DIASTOLIC BLOOD PRESSURE: 80 MMHG | RESPIRATION RATE: 16 BRPM | HEART RATE: 78 BPM | TEMPERATURE: 97.2 F | OXYGEN SATURATION: 98 % | SYSTOLIC BLOOD PRESSURE: 118 MMHG | BODY MASS INDEX: 31.11 KG/M2

## 2024-02-23 DIAGNOSIS — Z14.8 HEMOCHROMATOSIS CARRIER: ICD-10-CM

## 2024-02-23 DIAGNOSIS — D75.1 POLYCYTHEMIA: ICD-10-CM

## 2024-02-23 DIAGNOSIS — R71.8 ELEVATED HEMATOCRIT: ICD-10-CM

## 2024-02-23 LAB
BASOPHILS # BLD AUTO: 0.03 X10*3/UL (ref 0–0.1)
BASOPHILS NFR BLD AUTO: 0.4 %
EOSINOPHIL # BLD AUTO: 0.2 X10*3/UL (ref 0–0.7)
EOSINOPHIL NFR BLD AUTO: 2.6 %
ERYTHROCYTE [DISTWIDTH] IN BLOOD BY AUTOMATED COUNT: 14.2 % (ref 11.5–14.5)
HCT VFR BLD AUTO: 45.7 % (ref 36–46)
HGB BLD-MCNC: 14.4 G/DL (ref 12–16)
IMM GRANULOCYTES # BLD AUTO: 0.03 X10*3/UL (ref 0–0.7)
IMM GRANULOCYTES NFR BLD AUTO: 0.4 % (ref 0–0.9)
LYMPHOCYTES # BLD AUTO: 1.03 X10*3/UL (ref 1.2–4.8)
LYMPHOCYTES NFR BLD AUTO: 13.4 %
MCH RBC QN AUTO: 28.9 PG (ref 26–34)
MCHC RBC AUTO-ENTMCNC: 31.5 G/DL (ref 32–36)
MCV RBC AUTO: 92 FL (ref 80–100)
MONOCYTES # BLD AUTO: 0.48 X10*3/UL (ref 0.1–1)
MONOCYTES NFR BLD AUTO: 6.3 %
NEUTROPHILS # BLD AUTO: 5.89 X10*3/UL (ref 1.2–7.7)
NEUTROPHILS NFR BLD AUTO: 76.9 %
NRBC BLD-RTO: ABNORMAL /100{WBCS}
PLATELET # BLD AUTO: 312 X10*3/UL (ref 150–450)
RBC # BLD AUTO: 4.98 X10*6/UL (ref 4–5.2)
WBC # BLD AUTO: 7.7 X10*3/UL (ref 4.4–11.3)

## 2024-02-23 PROCEDURE — 36415 COLL VENOUS BLD VENIPUNCTURE: CPT

## 2024-02-23 PROCEDURE — 85025 COMPLETE CBC W/AUTO DIFF WBC: CPT

## 2024-02-23 ASSESSMENT — PAIN SCALES - GENERAL: PAINLEVEL: 4

## 2024-02-23 NOTE — PROGRESS NOTES
Pt seen for possible phlebotomy, hgb below parameter of 15 at 14.4. No phleb needed. Pt discharged in stable condition, no further needs at this time.

## 2024-02-27 DIAGNOSIS — D75.1 POLYCYTHEMIA: ICD-10-CM

## 2024-03-22 ENCOUNTER — APPOINTMENT (OUTPATIENT)
Dept: PRIMARY CARE | Facility: CLINIC | Age: 49
End: 2024-03-22
Payer: COMMERCIAL

## 2024-03-22 ENCOUNTER — INFUSION (OUTPATIENT)
Dept: HEMATOLOGY/ONCOLOGY | Facility: CLINIC | Age: 49
End: 2024-03-22
Payer: COMMERCIAL

## 2024-03-22 ENCOUNTER — APPOINTMENT (OUTPATIENT)
Dept: LAB | Facility: CLINIC | Age: 49
End: 2024-03-22
Payer: COMMERCIAL

## 2024-03-22 VITALS
OXYGEN SATURATION: 100 % | HEART RATE: 86 BPM | SYSTOLIC BLOOD PRESSURE: 130 MMHG | TEMPERATURE: 97.5 F | WEIGHT: 201.61 LBS | RESPIRATION RATE: 18 BRPM | DIASTOLIC BLOOD PRESSURE: 75 MMHG | BODY MASS INDEX: 30.56 KG/M2

## 2024-03-22 DIAGNOSIS — Z14.8 HEMOCHROMATOSIS CARRIER: ICD-10-CM

## 2024-03-22 DIAGNOSIS — E83.19 IRON OVERLOAD: ICD-10-CM

## 2024-03-22 DIAGNOSIS — D75.1 POLYCYTHEMIA: ICD-10-CM

## 2024-03-22 DIAGNOSIS — R71.8 ELEVATED HEMATOCRIT: ICD-10-CM

## 2024-03-22 LAB
BASOPHILS # BLD AUTO: 0.03 X10*3/UL (ref 0–0.1)
BASOPHILS NFR BLD AUTO: 0.4 %
EOSINOPHIL # BLD AUTO: 0.41 X10*3/UL (ref 0–0.7)
EOSINOPHIL NFR BLD AUTO: 4.9 %
ERYTHROCYTE [DISTWIDTH] IN BLOOD BY AUTOMATED COUNT: 14.1 % (ref 11.5–14.5)
HCT VFR BLD AUTO: 48 % (ref 36–46)
HGB BLD-MCNC: 15.1 G/DL (ref 12–16)
IMM GRANULOCYTES # BLD AUTO: 0.03 X10*3/UL (ref 0–0.7)
IMM GRANULOCYTES NFR BLD AUTO: 0.4 % (ref 0–0.9)
LYMPHOCYTES # BLD AUTO: 1.1 X10*3/UL (ref 1.2–4.8)
LYMPHOCYTES NFR BLD AUTO: 13.2 %
MCH RBC QN AUTO: 29.3 PG (ref 26–34)
MCHC RBC AUTO-ENTMCNC: 31.5 G/DL (ref 32–36)
MCV RBC AUTO: 93 FL (ref 80–100)
MONOCYTES # BLD AUTO: 0.46 X10*3/UL (ref 0.1–1)
MONOCYTES NFR BLD AUTO: 5.5 %
NEUTROPHILS # BLD AUTO: 6.32 X10*3/UL (ref 1.2–7.7)
NEUTROPHILS NFR BLD AUTO: 75.6 %
NRBC BLD-RTO: ABNORMAL /100{WBCS}
PLATELET # BLD AUTO: 282 X10*3/UL (ref 150–450)
RBC # BLD AUTO: 5.16 X10*6/UL (ref 4–5.2)
WBC # BLD AUTO: 8.4 X10*3/UL (ref 4.4–11.3)

## 2024-03-22 PROCEDURE — 84165 PROTEIN E-PHORESIS SERUM: CPT

## 2024-03-22 PROCEDURE — 82728 ASSAY OF FERRITIN: CPT

## 2024-03-22 PROCEDURE — 99195 PHLEBOTOMY: CPT

## 2024-03-22 PROCEDURE — 84165 PROTEIN E-PHORESIS SERUM: CPT | Performed by: STUDENT IN AN ORGANIZED HEALTH CARE EDUCATION/TRAINING PROGRAM

## 2024-03-22 PROCEDURE — 80053 COMPREHEN METABOLIC PANEL: CPT

## 2024-03-22 PROCEDURE — 85025 COMPLETE CBC W/AUTO DIFF WBC: CPT

## 2024-03-22 PROCEDURE — 86320 SERUM IMMUNOELECTROPHORESIS: CPT | Performed by: STUDENT IN AN ORGANIZED HEALTH CARE EDUCATION/TRAINING PROGRAM

## 2024-03-22 PROCEDURE — 83521 IG LIGHT CHAINS FREE EACH: CPT

## 2024-03-22 PROCEDURE — 36415 COLL VENOUS BLD VENIPUNCTURE: CPT

## 2024-03-22 PROCEDURE — 82105 ALPHA-FETOPROTEIN SERUM: CPT

## 2024-03-22 PROCEDURE — 83540 ASSAY OF IRON: CPT

## 2024-03-22 PROCEDURE — 84155 ASSAY OF PROTEIN SERUM: CPT | Mod: 59

## 2024-03-22 RX ORDER — DIPHENHYDRAMINE HYDROCHLORIDE 50 MG/ML
50 INJECTION INTRAMUSCULAR; INTRAVENOUS AS NEEDED
OUTPATIENT
Start: 2024-03-22

## 2024-03-22 RX ORDER — ALBUTEROL SULFATE 0.83 MG/ML
3 SOLUTION RESPIRATORY (INHALATION) AS NEEDED
OUTPATIENT
Start: 2024-03-22

## 2024-03-22 RX ORDER — EPINEPHRINE 0.3 MG/.3ML
0.3 INJECTION SUBCUTANEOUS EVERY 5 MIN PRN
OUTPATIENT
Start: 2024-03-22

## 2024-03-22 RX ORDER — FAMOTIDINE 10 MG/ML
20 INJECTION INTRAVENOUS ONCE AS NEEDED
OUTPATIENT
Start: 2024-03-22

## 2024-03-22 RX ORDER — HEPARIN 100 UNIT/ML
500 SYRINGE INTRAVENOUS AS NEEDED
OUTPATIENT
Start: 2024-03-22

## 2024-03-22 RX ORDER — HEPARIN SODIUM,PORCINE/PF 10 UNIT/ML
50 SYRINGE (ML) INTRAVENOUS AS NEEDED
OUTPATIENT
Start: 2024-03-22

## 2024-03-22 ASSESSMENT — PAIN SCALES - GENERAL: PAINLEVEL: 6

## 2024-03-22 NOTE — PROGRESS NOTES
Patient here for phlebotomy. Phleb started at 1520 and completed at 1537. Patient tolerated well. Stay the observational 30 minutes. Denied dizzyness, chest pain, or sob. Patient ambulated in hallway assisted by RN. Gait steady. Denied any dizzyness. Patient independently ambulatory off unit with steady gait. Call back instructions reviewed. Patient verbalized understanding. No further infusion appointments scheduled. Message sent to team.

## 2024-03-23 LAB
AFP SERPL-MCNC: <4 NG/ML (ref 0–9)
ALBUMIN SERPL BCP-MCNC: 4.3 G/DL (ref 3.4–5)
ALP SERPL-CCNC: 59 U/L (ref 33–110)
ALT SERPL W P-5'-P-CCNC: 20 U/L (ref 7–45)
ANION GAP SERPL CALC-SCNC: 14 MMOL/L (ref 10–20)
AST SERPL W P-5'-P-CCNC: 20 U/L (ref 9–39)
BILIRUB SERPL-MCNC: 0.4 MG/DL (ref 0–1.2)
BUN SERPL-MCNC: 12 MG/DL (ref 6–23)
CALCIUM SERPL-MCNC: 9.9 MG/DL (ref 8.6–10.6)
CHLORIDE SERPL-SCNC: 101 MMOL/L (ref 98–107)
CO2 SERPL-SCNC: 30 MMOL/L (ref 21–32)
CREAT SERPL-MCNC: 1.1 MG/DL (ref 0.5–1.05)
EGFRCR SERPLBLD CKD-EPI 2021: 63 ML/MIN/1.73M*2
FERRITIN SERPL-MCNC: 84 NG/ML (ref 8–150)
GLUCOSE SERPL-MCNC: 86 MG/DL (ref 74–99)
IRON SATN MFR SERPL: 20 % (ref 25–45)
IRON SERPL-MCNC: 61 UG/DL (ref 35–150)
POTASSIUM SERPL-SCNC: 4.5 MMOL/L (ref 3.5–5.3)
PROT SERPL-MCNC: 7.1 G/DL (ref 6.4–8.2)
PROT SERPL-MCNC: 7.1 G/DL (ref 6.4–8.2)
SODIUM SERPL-SCNC: 140 MMOL/L (ref 136–145)
TIBC SERPL-MCNC: 312 UG/DL (ref 240–445)
UIBC SERPL-MCNC: 251 UG/DL (ref 110–370)

## 2024-03-24 LAB
KAPPA LC SERPL-MCNC: 1.69 MG/DL (ref 0.33–1.94)
KAPPA LC/LAMBDA SER: 1.74 {RATIO} (ref 0.26–1.65)
LAMBDA LC SERPL-MCNC: 0.97 MG/DL (ref 0.57–2.63)

## 2024-03-26 ENCOUNTER — APPOINTMENT (OUTPATIENT)
Dept: PRIMARY CARE | Facility: CLINIC | Age: 49
End: 2024-03-26
Payer: COMMERCIAL

## 2024-03-30 LAB
ALBUMIN: 4.2 G/DL (ref 3.4–5)
ALPHA 1 GLOBULIN: 0.3 G/DL (ref 0.2–0.6)
ALPHA 2 GLOBULIN: 0.7 G/DL (ref 0.4–1.1)
BETA GLOBULIN: 0.8 G/DL (ref 0.5–1.2)
GAMMA GLOBULIN: 1.1 G/DL (ref 0.5–1.4)
IMMUNOFIXATION COMMENT: NORMAL
PATH REVIEW - SERUM IMMUNOFIXATION: NORMAL
PATH REVIEW-SERUM PROTEIN ELECTROPHORESIS: NORMAL
PROTEIN ELECTROPHORESIS COMMENT: NORMAL

## 2024-04-02 DIAGNOSIS — Z14.8 HEMOCHROMATOSIS CARRIER: Primary | ICD-10-CM

## 2024-04-02 DIAGNOSIS — Z14.8 HEMOCHROMATOSIS CARRIER: ICD-10-CM

## 2024-04-15 ENCOUNTER — OFFICE VISIT (OUTPATIENT)
Dept: NEPHROLOGY | Facility: CLINIC | Age: 49
End: 2024-04-15
Payer: COMMERCIAL

## 2024-04-15 VITALS
DIASTOLIC BLOOD PRESSURE: 68 MMHG | SYSTOLIC BLOOD PRESSURE: 112 MMHG | BODY MASS INDEX: 30.07 KG/M2 | HEIGHT: 69 IN | WEIGHT: 203 LBS | HEART RATE: 76 BPM

## 2024-04-15 DIAGNOSIS — N18.31 STAGE 3A CHRONIC KIDNEY DISEASE (MULTI): Primary | ICD-10-CM

## 2024-04-15 PROCEDURE — 3048F LDL-C <100 MG/DL: CPT | Performed by: INTERNAL MEDICINE

## 2024-04-15 PROCEDURE — 3078F DIAST BP <80 MM HG: CPT | Performed by: INTERNAL MEDICINE

## 2024-04-15 PROCEDURE — 99213 OFFICE O/P EST LOW 20 MIN: CPT | Performed by: INTERNAL MEDICINE

## 2024-04-15 PROCEDURE — 3062F POS MACROALBUMINURIA REV: CPT | Performed by: INTERNAL MEDICINE

## 2024-04-15 PROCEDURE — 3044F HG A1C LEVEL LT 7.0%: CPT | Performed by: INTERNAL MEDICINE

## 2024-04-15 PROCEDURE — 3074F SYST BP LT 130 MM HG: CPT | Performed by: INTERNAL MEDICINE

## 2024-04-15 ASSESSMENT — PAIN SCALES - GENERAL: PAINLEVEL: 6

## 2024-04-15 NOTE — PROGRESS NOTES
Chief Complaint: Follow up CKD    No specific complaints  Receiving phlebotomy for hemachromatosis  Denies nausea, vomiting, chest pain, dyspnea  No urinary symptoms    NAD  Sclera AI s inj  MMM, no sores  Deferred secondary to COVID  No edema  No tremor  No rash    CKD stage 3a versus JESSICA  HTN none  Proteinuria none    [facility] testing  Labs and follow up in 6 months  Discussed avoidance of nephrotoxins

## 2024-04-17 ENCOUNTER — OFFICE VISIT (OUTPATIENT)
Dept: PRIMARY CARE | Facility: CLINIC | Age: 49
End: 2024-04-17
Payer: COMMERCIAL

## 2024-04-17 VITALS
BODY MASS INDEX: 29.98 KG/M2 | DIASTOLIC BLOOD PRESSURE: 70 MMHG | WEIGHT: 203 LBS | TEMPERATURE: 97.2 F | SYSTOLIC BLOOD PRESSURE: 110 MMHG

## 2024-04-17 DIAGNOSIS — M54.2 NECK PAIN, ACUTE: Primary | ICD-10-CM

## 2024-04-17 DIAGNOSIS — M54.32 LEFT SIDED SCIATICA: ICD-10-CM

## 2024-04-17 DIAGNOSIS — R51.9 NONINTRACTABLE HEADACHE, UNSPECIFIED CHRONICITY PATTERN, UNSPECIFIED HEADACHE TYPE: ICD-10-CM

## 2024-04-17 DIAGNOSIS — M54.42 CHRONIC LEFT-SIDED LOW BACK PAIN WITH LEFT-SIDED SCIATICA: ICD-10-CM

## 2024-04-17 DIAGNOSIS — G89.29 CHRONIC LEFT-SIDED LOW BACK PAIN WITH LEFT-SIDED SCIATICA: ICD-10-CM

## 2024-04-17 PROCEDURE — 3062F POS MACROALBUMINURIA REV: CPT | Performed by: INTERNAL MEDICINE

## 2024-04-17 PROCEDURE — 1036F TOBACCO NON-USER: CPT | Performed by: INTERNAL MEDICINE

## 2024-04-17 PROCEDURE — 3074F SYST BP LT 130 MM HG: CPT | Performed by: INTERNAL MEDICINE

## 2024-04-17 PROCEDURE — 3078F DIAST BP <80 MM HG: CPT | Performed by: INTERNAL MEDICINE

## 2024-04-17 PROCEDURE — 3044F HG A1C LEVEL LT 7.0%: CPT | Performed by: INTERNAL MEDICINE

## 2024-04-17 PROCEDURE — 99213 OFFICE O/P EST LOW 20 MIN: CPT | Performed by: INTERNAL MEDICINE

## 2024-04-17 PROCEDURE — 3048F LDL-C <100 MG/DL: CPT | Performed by: INTERNAL MEDICINE
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
## 2024-04-19 ENCOUNTER — APPOINTMENT (OUTPATIENT)
Dept: LAB | Facility: CLINIC | Age: 49
End: 2024-04-19
Payer: COMMERCIAL

## 2024-04-29 ENCOUNTER — TRANSCRIBE ORDERS (OUTPATIENT)
Dept: ORTHOPEDIC SURGERY | Facility: HOSPITAL | Age: 49
End: 2024-04-29

## 2024-04-29 ENCOUNTER — CONSULT (OUTPATIENT)
Dept: ORTHOPEDIC SURGERY | Facility: CLINIC | Age: 49
End: 2024-04-29
Payer: COMMERCIAL

## 2024-04-29 DIAGNOSIS — G89.29 CHRONIC LEFT-SIDED LOW BACK PAIN WITH LEFT-SIDED SCIATICA: ICD-10-CM

## 2024-04-29 DIAGNOSIS — R51.9 NONINTRACTABLE HEADACHE, UNSPECIFIED CHRONICITY PATTERN, UNSPECIFIED HEADACHE TYPE: ICD-10-CM

## 2024-04-29 DIAGNOSIS — M54.2 CERVICALGIA: ICD-10-CM

## 2024-04-29 DIAGNOSIS — M47.812 CERVICAL SPONDYLOSIS: Primary | ICD-10-CM

## 2024-04-29 DIAGNOSIS — M54.42 CHRONIC LEFT-SIDED LOW BACK PAIN WITH LEFT-SIDED SCIATICA: ICD-10-CM

## 2024-04-29 DIAGNOSIS — M54.16 LUMBAR RADICULITIS: ICD-10-CM

## 2024-04-29 DIAGNOSIS — M54.2 NECK PAIN, ACUTE: ICD-10-CM

## 2024-04-29 DIAGNOSIS — M54.32 LEFT SIDED SCIATICA: ICD-10-CM

## 2024-04-29 DIAGNOSIS — M54.2 NECK PAIN: ICD-10-CM

## 2024-04-29 PROCEDURE — 99204 OFFICE O/P NEW MOD 45 MIN: CPT | Performed by: PHYSICAL MEDICINE & REHABILITATION

## 2024-04-29 RX ORDER — METHOCARBAMOL 500 MG/1
TABLET, FILM COATED ORAL
Qty: 60 TABLET | Refills: 1 | Status: SHIPPED | OUTPATIENT
Start: 2024-04-29 | End: 2024-05-31 | Stop reason: ALTCHOICE

## 2024-04-29 ASSESSMENT — PAIN DESCRIPTION - DESCRIPTORS: DESCRIPTORS: ACHING;SORE

## 2024-04-29 ASSESSMENT — PAIN - FUNCTIONAL ASSESSMENT: PAIN_FUNCTIONAL_ASSESSMENT: 0-10

## 2024-04-29 ASSESSMENT — PAIN SCALES - GENERAL: PAINLEVEL_OUTOF10: 6
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
## 2024-04-30 ENCOUNTER — APPOINTMENT (OUTPATIENT)
Dept: ORTHOPEDIC SURGERY | Facility: CLINIC | Age: 49
End: 2024-04-30
Payer: COMMERCIAL

## 2024-04-30 ENCOUNTER — APPOINTMENT (OUTPATIENT)
Dept: RADIOLOGY | Facility: CLINIC | Age: 49
End: 2024-04-30
Payer: COMMERCIAL

## 2024-04-30 ENCOUNTER — TELEPHONE (OUTPATIENT)
Dept: PRIMARY CARE | Facility: CLINIC | Age: 49
End: 2024-04-30
Payer: COMMERCIAL

## 2024-04-30 NOTE — TELEPHONE ENCOUNTER
Mirna left a msg wanting to know, since she has seen Dr. Almaraz (ortho), and he has entered PT and MRI orders, does she need to continue to see dave Dow?  She had an appt scheduled with him but is cancelling as she doesn't feel well today.

## 2024-05-01 ENCOUNTER — TELEPHONE (OUTPATIENT)
Dept: ORTHOPEDIC SURGERY | Facility: CLINIC | Age: 49
End: 2024-05-01
Payer: COMMERCIAL

## 2024-05-01 NOTE — TELEPHONE ENCOUNTER
Pt. Was last seen on 4/29. She states [facility] works better for her work schedule and they offer chiropractic services, acupuncture services as well as massage therapy. She is willing to sign a release of info and would need orders placed for these additional services if you see fit. Please advise.   Thanks,  Fay

## 2024-05-15 ENCOUNTER — HOSPITAL ENCOUNTER (OUTPATIENT)
Dept: RADIOLOGY | Facility: EXTERNAL LOCATION | Age: 49
Discharge: HOME | End: 2024-05-15
Payer: COMMERCIAL

## 2024-05-16 ENCOUNTER — APPOINTMENT (OUTPATIENT)
Dept: PRIMARY CARE | Facility: CLINIC | Age: 49
End: 2024-05-16
Payer: COMMERCIAL

## 2024-05-17 ENCOUNTER — INFUSION (OUTPATIENT)
Dept: HEMATOLOGY/ONCOLOGY | Facility: CLINIC | Age: 49
End: 2024-05-17
Payer: COMMERCIAL

## 2024-05-17 ENCOUNTER — APPOINTMENT (OUTPATIENT)
Dept: LAB | Facility: CLINIC | Age: 49
End: 2024-05-17
Payer: COMMERCIAL

## 2024-05-17 VITALS
TEMPERATURE: 96.6 F | OXYGEN SATURATION: 99 % | DIASTOLIC BLOOD PRESSURE: 79 MMHG | WEIGHT: 206.35 LBS | RESPIRATION RATE: 16 BRPM | SYSTOLIC BLOOD PRESSURE: 126 MMHG | HEART RATE: 76 BPM | BODY MASS INDEX: 30.47 KG/M2

## 2024-05-17 DIAGNOSIS — D75.1 POLYCYTHEMIA: Primary | ICD-10-CM

## 2024-05-17 DIAGNOSIS — Z14.8 HEMOCHROMATOSIS CARRIER: ICD-10-CM

## 2024-05-17 DIAGNOSIS — D75.1 POLYCYTHEMIA: ICD-10-CM

## 2024-05-17 LAB
BASOPHILS # BLD AUTO: 0.03 X10*3/UL (ref 0–0.1)
BASOPHILS NFR BLD AUTO: 0.3 %
EOSINOPHIL # BLD AUTO: 0.3 X10*3/UL (ref 0–0.7)
EOSINOPHIL NFR BLD AUTO: 3.2 %
ERYTHROCYTE [DISTWIDTH] IN BLOOD BY AUTOMATED COUNT: 13.5 % (ref 11.5–14.5)
HCT VFR BLD AUTO: 46.7 % (ref 36–46)
HGB BLD-MCNC: 14.3 G/DL (ref 12–16)
IMM GRANULOCYTES # BLD AUTO: 0.02 X10*3/UL (ref 0–0.7)
IMM GRANULOCYTES NFR BLD AUTO: 0.2 % (ref 0–0.9)
LYMPHOCYTES # BLD AUTO: 0.91 X10*3/UL (ref 1.2–4.8)
LYMPHOCYTES NFR BLD AUTO: 9.6 %
MCH RBC QN AUTO: 28.3 PG (ref 26–34)
MCHC RBC AUTO-ENTMCNC: 30.6 G/DL (ref 32–36)
MCV RBC AUTO: 93 FL (ref 80–100)
MONOCYTES # BLD AUTO: 0.5 X10*3/UL (ref 0.1–1)
MONOCYTES NFR BLD AUTO: 5.3 %
NEUTROPHILS # BLD AUTO: 7.69 X10*3/UL (ref 1.2–7.7)
NEUTROPHILS NFR BLD AUTO: 81.4 %
NRBC BLD-RTO: ABNORMAL /100{WBCS}
PLATELET # BLD AUTO: 298 X10*3/UL (ref 150–450)
RBC # BLD AUTO: 5.05 X10*6/UL (ref 4–5.2)
WBC # BLD AUTO: 9.5 X10*3/UL (ref 4.4–11.3)

## 2024-05-17 PROCEDURE — 82728 ASSAY OF FERRITIN: CPT

## 2024-05-17 PROCEDURE — 80053 COMPREHEN METABOLIC PANEL: CPT

## 2024-05-17 PROCEDURE — 36415 COLL VENOUS BLD VENIPUNCTURE: CPT

## 2024-05-17 PROCEDURE — 85025 COMPLETE CBC W/AUTO DIFF WBC: CPT

## 2024-05-17 PROCEDURE — 83540 ASSAY OF IRON: CPT

## 2024-05-17 ASSESSMENT — PAIN SCALES - GENERAL: PAINLEVEL: 5

## 2024-05-18 LAB
ALBUMIN SERPL BCP-MCNC: 4.3 G/DL (ref 3.4–5)
ALP SERPL-CCNC: 62 U/L (ref 33–110)
ALT SERPL W P-5'-P-CCNC: 21 U/L (ref 7–45)
ANION GAP SERPL CALC-SCNC: 16 MMOL/L (ref 10–20)
AST SERPL W P-5'-P-CCNC: 20 U/L (ref 9–39)
BILIRUB SERPL-MCNC: 0.3 MG/DL (ref 0–1.2)
BUN SERPL-MCNC: 16 MG/DL (ref 6–23)
CALCIUM SERPL-MCNC: 9.6 MG/DL (ref 8.6–10.6)
CHLORIDE SERPL-SCNC: 100 MMOL/L (ref 98–107)
CO2 SERPL-SCNC: 29 MMOL/L (ref 21–32)
CREAT SERPL-MCNC: 1.3 MG/DL (ref 0.5–1.05)
EGFRCR SERPLBLD CKD-EPI 2021: 51 ML/MIN/1.73M*2
FERRITIN SERPL-MCNC: 48 NG/ML (ref 8–150)
GLUCOSE SERPL-MCNC: 93 MG/DL (ref 74–99)
IRON SATN MFR SERPL: 13 % (ref 25–45)
IRON SERPL-MCNC: 44 UG/DL (ref 35–150)
POTASSIUM SERPL-SCNC: 4.5 MMOL/L (ref 3.5–5.3)
PROT SERPL-MCNC: 6.8 G/DL (ref 6.4–8.2)
SODIUM SERPL-SCNC: 140 MMOL/L (ref 136–145)
TIBC SERPL-MCNC: 333 UG/DL (ref 240–445)
UIBC SERPL-MCNC: 289 UG/DL (ref 110–370)

## 2024-05-23 ENCOUNTER — TELEMEDICINE (OUTPATIENT)
Dept: NEPHROLOGY | Facility: CLINIC | Age: 49
End: 2024-05-23
Payer: COMMERCIAL

## 2024-05-23 DIAGNOSIS — N18.31 HYPERTENSIVE KIDNEY DISEASE WITH STAGE 3A CHRONIC KIDNEY DISEASE (MULTI): Primary | ICD-10-CM

## 2024-05-23 DIAGNOSIS — I12.9 HYPERTENSIVE KIDNEY DISEASE WITH STAGE 3A CHRONIC KIDNEY DISEASE (MULTI): Primary | ICD-10-CM

## 2024-05-23 PROCEDURE — 3062F POS MACROALBUMINURIA REV: CPT | Performed by: INTERNAL MEDICINE

## 2024-05-23 PROCEDURE — 99213 OFFICE O/P EST LOW 20 MIN: CPT | Performed by: INTERNAL MEDICINE

## 2024-05-23 PROCEDURE — 3044F HG A1C LEVEL LT 7.0%: CPT | Performed by: INTERNAL MEDICINE

## 2024-05-23 PROCEDURE — 3048F LDL-C <100 MG/DL: CPT | Performed by: INTERNAL MEDICINE

## 2024-05-23 NOTE — PROGRESS NOTES
An interactive audio and video telecommunication system which permits real time communications between the patient (at the originating site) and provider (at the distant site) was utilized to provide this telehealth service.  Verbal consent was requested and obtained from patient on this date for a telehealth visit.    No new complaints  No recent hospitalizations  No urinary symptoms     Home Blood pressures not checking  Weight stable  No edema    Stage CKD 3a, stable  No significant proteinuria    Discussed [facility] genetic testing result c/w ADPKD  Will have office mail result to patient  Genetic counseling  Labs and follow up in 6 months  Await renal ultrasound

## 2024-05-28 ENCOUNTER — OFFICE VISIT (OUTPATIENT)
Dept: ORTHOPEDIC SURGERY | Facility: CLINIC | Age: 49
End: 2024-05-28
Payer: COMMERCIAL

## 2024-05-28 ENCOUNTER — HOSPITAL ENCOUNTER (OUTPATIENT)
Dept: RADIOLOGY | Facility: CLINIC | Age: 49
Discharge: HOME | End: 2024-05-28
Payer: COMMERCIAL

## 2024-05-28 DIAGNOSIS — M50.20 CERVICAL DISCOGENIC PAIN SYNDROME: ICD-10-CM

## 2024-05-28 DIAGNOSIS — M47.812 CERVICAL SPONDYLOSIS: Primary | ICD-10-CM

## 2024-05-28 DIAGNOSIS — M47.812 CERVICAL SPONDYLOSIS: ICD-10-CM

## 2024-05-28 PROCEDURE — 72141 MRI NECK SPINE W/O DYE: CPT | Performed by: RADIOLOGY

## 2024-05-28 PROCEDURE — 3048F LDL-C <100 MG/DL: CPT | Performed by: PHYSICAL MEDICINE & REHABILITATION

## 2024-05-28 PROCEDURE — 3062F POS MACROALBUMINURIA REV: CPT | Performed by: PHYSICAL MEDICINE & REHABILITATION

## 2024-05-28 PROCEDURE — 72141 MRI NECK SPINE W/O DYE: CPT

## 2024-05-28 PROCEDURE — 1036F TOBACCO NON-USER: CPT | Performed by: PHYSICAL MEDICINE & REHABILITATION

## 2024-05-28 PROCEDURE — 99213 OFFICE O/P EST LOW 20 MIN: CPT | Performed by: PHYSICAL MEDICINE & REHABILITATION

## 2024-05-28 PROCEDURE — 3044F HG A1C LEVEL LT 7.0%: CPT | Performed by: PHYSICAL MEDICINE & REHABILITATION

## 2024-05-28 ASSESSMENT — PAIN SCALES - GENERAL: PAINLEVEL_OUTOF10: 7

## 2024-05-28 ASSESSMENT — PAIN - FUNCTIONAL ASSESSMENT: PAIN_FUNCTIONAL_ASSESSMENT: 0-10
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
## 2024-05-31 ENCOUNTER — OFFICE VISIT (OUTPATIENT)
Dept: URGENT CARE | Facility: CLINIC | Age: 49
End: 2024-05-31
Payer: COMMERCIAL

## 2024-05-31 VITALS
TEMPERATURE: 98.2 F | SYSTOLIC BLOOD PRESSURE: 145 MMHG | OXYGEN SATURATION: 98 % | HEART RATE: 117 BPM | DIASTOLIC BLOOD PRESSURE: 87 MMHG

## 2024-05-31 DIAGNOSIS — J01.10 ACUTE NON-RECURRENT FRONTAL SINUSITIS: Primary | ICD-10-CM

## 2024-05-31 PROCEDURE — 3062F POS MACROALBUMINURIA REV: CPT | Performed by: NURSE PRACTITIONER

## 2024-05-31 PROCEDURE — 3044F HG A1C LEVEL LT 7.0%: CPT | Performed by: NURSE PRACTITIONER

## 2024-05-31 PROCEDURE — 99213 OFFICE O/P EST LOW 20 MIN: CPT | Performed by: NURSE PRACTITIONER

## 2024-05-31 PROCEDURE — 3077F SYST BP >= 140 MM HG: CPT | Performed by: NURSE PRACTITIONER

## 2024-05-31 PROCEDURE — 3079F DIAST BP 80-89 MM HG: CPT | Performed by: NURSE PRACTITIONER

## 2024-05-31 PROCEDURE — 3048F LDL-C <100 MG/DL: CPT | Performed by: NURSE PRACTITIONER

## 2024-05-31 RX ORDER — AMOXICILLIN 875 MG/1
875 TABLET, FILM COATED ORAL 2 TIMES DAILY
Qty: 14 TABLET | Refills: 0 | Status: SHIPPED | OUTPATIENT
Start: 2024-05-31 | End: 2024-06-07

## 2024-05-31 ASSESSMENT — ENCOUNTER SYMPTOMS: SINUS COMPLAINT: 1

## 2024-05-31 NOTE — PROGRESS NOTES
Subjective   Patient ID: Mirna De La Vega is a 49 y.o. female.    H/o allergies and sx started last week with allergies, pressure behind eyes, cough with productive mucous, worse when she comes from outside, eye and ears itching. Also sneezing, used benadryl last night.  She states she does not like taking over-the-counter medications because she is currently being evaluated for elevated creatinine levels and has an upcoming renal ultrasound this coming week following with nephrology.  I did review her last few renal function panel she trends anywhere from 1.3-1.1.    LMP: Now        Sinus Problem      The following portions of the chart were reviewed this encounter and updated as appropriate:         Review of Systems   All other systems reviewed and are negative.    Objective   Physical Exam  Vitals and nursing note reviewed.   Constitutional:       General: She is not in acute distress.     Appearance: Normal appearance. She is not toxic-appearing.   HENT:      Head: Normocephalic.      Right Ear: External ear normal.      Left Ear: External ear normal.      Nose: Mucosal edema and congestion present.      Mouth/Throat:      Mouth: Mucous membranes are moist.      Pharynx: No oropharyngeal exudate or posterior oropharyngeal erythema.   Eyes:      Extraocular Movements: Extraocular movements intact.   Cardiovascular:      Rate and Rhythm: Normal rate and regular rhythm.      Heart sounds: Normal heart sounds.   Pulmonary:      Effort: Pulmonary effort is normal.      Breath sounds: Normal breath sounds. No wheezing.   Musculoskeletal:         General: Normal range of motion.      Cervical back: Normal range of motion and neck supple.   Skin:     Capillary Refill: Capillary refill takes less than 2 seconds.   Neurological:      General: No focal deficit present.      Mental Status: She is alert and oriented to person, place, and time.   Psychiatric:         Mood and Affect: Mood normal.         Behavior: Behavior  normal.         Thought Content: Thought content normal.       Procedures    Assessment/Plan   Diagnoses and all orders for this visit:  Acute non-recurrent frontal sinusitis  -     amoxicillin (Amoxil) 875 mg tablet; Take 1 tablet (875 mg) by mouth 2 times a day for 7 days.  I still do recommend Zyrtec and left up to the renal dosing there is no major adjustments based on her GFR currently which is 51.  Discussed with nephrology whether or not they feel comfortable with you taking Zyrtec but I highly recommend it  Above plan of care was reviewed with the patient, all questions were answered, through shared decision making the patient agrees with this plan of care.    Red flags for strict return precaution have been reviewed with the patient and or patient ganatanaeldian, patient is alert, oriented and non-toxic appearing, has decision making capabilities and agrees with the plan of care through shared decision making at this time. Current diagnosis, any medication changes, lab or radiologic results have been reviewed with the patient at the time of visit. If symptoms do not improve, or worsen, patient is to follow up with PCP or report to the emergency room.   Patient is alert and oriented x3 vital signs stable nontoxic-appearing and has decision-making capabilities.    Please note that the majority this note was made with Dragon speaking software there may be grammatical errors secondary to the speaking program.      Patient disposition: Home

## 2024-06-01 ENCOUNTER — HOSPITAL ENCOUNTER (OUTPATIENT)
Dept: RADIOLOGY | Facility: CLINIC | Age: 49
Discharge: HOME | End: 2024-06-01
Payer: COMMERCIAL

## 2024-06-01 DIAGNOSIS — N18.31 STAGE 3A CHRONIC KIDNEY DISEASE (MULTI): ICD-10-CM

## 2024-06-01 PROCEDURE — 76770 US EXAM ABDO BACK WALL COMP: CPT

## 2024-06-01 PROCEDURE — 76770 US EXAM ABDO BACK WALL COMP: CPT | Performed by: RADIOLOGY

## 2024-06-04 ENCOUNTER — TELEPHONE (OUTPATIENT)
Dept: OBSTETRICS AND GYNECOLOGY | Facility: CLINIC | Age: 49
End: 2024-06-04
Payer: COMMERCIAL

## 2024-06-04 DIAGNOSIS — B37.31 YEAST VAGINITIS: ICD-10-CM

## 2024-06-04 NOTE — TELEPHONE ENCOUNTER
Patient calling with symptoms of a yeast infection. Started Thursday. Recent LMP last week and tried a new type of tampon that irritated her as well. Also was started on an antibiotic (amoxicillin) on Saturday from Urgent care. Is having vaginal itching and irritation, clear discharge, no odor. Requesting diflucan to be sent to [facility] to take once she has complete course of antibiotics. Pended for Dr. Harris to review. Patient last seen 09/2023 and call transferred to  to schedule patient for annual per her request.            6/6/24 Patient calling again to see if she can get the Diflucan sent she would also like a vaginal cream to help with the itching on the outside. She does not want Monistat something else instead please

## 2024-06-10 RX ORDER — FLUCONAZOLE 150 MG/1
150 TABLET ORAL ONCE
Qty: 1 TABLET | Refills: 0 | Status: SHIPPED | OUTPATIENT
Start: 2024-06-10 | End: 2024-06-10

## 2024-06-11 ENCOUNTER — APPOINTMENT (OUTPATIENT)
Dept: PRIMARY CARE | Facility: CLINIC | Age: 49
End: 2024-06-11
Payer: COMMERCIAL

## 2024-06-14 ENCOUNTER — INFUSION (OUTPATIENT)
Dept: HEMATOLOGY/ONCOLOGY | Facility: CLINIC | Age: 49
End: 2024-06-14
Payer: COMMERCIAL

## 2024-06-14 VITALS
SYSTOLIC BLOOD PRESSURE: 128 MMHG | RESPIRATION RATE: 14 BRPM | OXYGEN SATURATION: 95 % | HEART RATE: 98 BPM | BODY MASS INDEX: 30.59 KG/M2 | WEIGHT: 207.12 LBS | DIASTOLIC BLOOD PRESSURE: 77 MMHG | TEMPERATURE: 97.2 F

## 2024-06-14 DIAGNOSIS — D75.1 POLYCYTHEMIA: ICD-10-CM

## 2024-06-14 DIAGNOSIS — Z14.8 HEMOCHROMATOSIS CARRIER: ICD-10-CM

## 2024-06-14 LAB
BASOPHILS # BLD AUTO: 0.02 X10*3/UL (ref 0–0.1)
BASOPHILS NFR BLD AUTO: 0.2 %
EOSINOPHIL # BLD AUTO: 0.08 X10*3/UL (ref 0–0.7)
EOSINOPHIL NFR BLD AUTO: 1 %
ERYTHROCYTE [DISTWIDTH] IN BLOOD BY AUTOMATED COUNT: 13.9 % (ref 11.5–14.5)
HCT VFR BLD AUTO: 44 % (ref 36–46)
HGB BLD-MCNC: 14 G/DL (ref 12–16)
IMM GRANULOCYTES # BLD AUTO: 0.02 X10*3/UL (ref 0–0.7)
IMM GRANULOCYTES NFR BLD AUTO: 0.2 % (ref 0–0.9)
LYMPHOCYTES # BLD AUTO: 0.93 X10*3/UL (ref 1.2–4.8)
LYMPHOCYTES NFR BLD AUTO: 11.3 %
MCH RBC QN AUTO: 28.6 PG (ref 26–34)
MCHC RBC AUTO-ENTMCNC: 31.8 G/DL (ref 32–36)
MCV RBC AUTO: 90 FL (ref 80–100)
MONOCYTES # BLD AUTO: 0.39 X10*3/UL (ref 0.1–1)
MONOCYTES NFR BLD AUTO: 4.7 %
NEUTROPHILS # BLD AUTO: 6.79 X10*3/UL (ref 1.2–7.7)
NEUTROPHILS NFR BLD AUTO: 82.6 %
NRBC BLD-RTO: ABNORMAL /100{WBCS}
PLATELET # BLD AUTO: 265 X10*3/UL (ref 150–450)
RBC # BLD AUTO: 4.9 X10*6/UL (ref 4–5.2)
WBC # BLD AUTO: 8.2 X10*3/UL (ref 4.4–11.3)

## 2024-06-14 PROCEDURE — 80053 COMPREHEN METABOLIC PANEL: CPT

## 2024-06-14 PROCEDURE — 83540 ASSAY OF IRON: CPT

## 2024-06-14 PROCEDURE — 36415 COLL VENOUS BLD VENIPUNCTURE: CPT

## 2024-06-14 PROCEDURE — 82728 ASSAY OF FERRITIN: CPT

## 2024-06-14 PROCEDURE — 85025 COMPLETE CBC W/AUTO DIFF WBC: CPT

## 2024-06-14 ASSESSMENT — PAIN SCALES - GENERAL: PAINLEVEL: 0-NO PAIN

## 2024-06-15 LAB
ALBUMIN SERPL BCP-MCNC: 3.9 G/DL (ref 3.4–5)
ALP SERPL-CCNC: 55 U/L (ref 33–110)
ALT SERPL W P-5'-P-CCNC: 18 U/L (ref 7–45)
ANION GAP SERPL CALC-SCNC: 13 MMOL/L (ref 10–20)
AST SERPL W P-5'-P-CCNC: 20 U/L (ref 9–39)
BILIRUB SERPL-MCNC: 0.4 MG/DL (ref 0–1.2)
BUN SERPL-MCNC: 22 MG/DL (ref 6–23)
CALCIUM SERPL-MCNC: 9.1 MG/DL (ref 8.6–10.6)
CHLORIDE SERPL-SCNC: 102 MMOL/L (ref 98–107)
CO2 SERPL-SCNC: 26 MMOL/L (ref 21–32)
CREAT SERPL-MCNC: 1.17 MG/DL (ref 0.5–1.05)
EGFRCR SERPLBLD CKD-EPI 2021: 58 ML/MIN/1.73M*2
FERRITIN SERPL-MCNC: 62 NG/ML (ref 8–150)
GLUCOSE SERPL-MCNC: 141 MG/DL (ref 74–99)
IRON SATN MFR SERPL: 21 % (ref 25–45)
IRON SERPL-MCNC: 67 UG/DL (ref 35–150)
POTASSIUM SERPL-SCNC: 4.3 MMOL/L (ref 3.5–5.3)
PROT SERPL-MCNC: 6.4 G/DL (ref 6.4–8.2)
SODIUM SERPL-SCNC: 137 MMOL/L (ref 136–145)
TIBC SERPL-MCNC: 318 UG/DL (ref 240–445)
UIBC SERPL-MCNC: 251 UG/DL (ref 110–370)

## 2024-07-01 ENCOUNTER — TELEPHONE (OUTPATIENT)
Dept: HEMATOLOGY/ONCOLOGY | Facility: CLINIC | Age: 49
End: 2024-07-01
Payer: COMMERCIAL

## 2024-07-01 NOTE — TELEPHONE ENCOUNTER
Dr. Manzano patient. Patient has a upcoming appointment on July 11th. She is questioning if she needs blood work again before this appointment because she had blood work on June 14th. Please call her to let her know. 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 Phone # [phone]

## 2024-07-01 NOTE — TELEPHONE ENCOUNTER
Call returned to patient and informed no further blood work needs done prior to her FUV on 7/11/24. Pt denies further questions or needs.  Patient verbalizes understanding of plan of care via teachback method.

## 2024-07-09 ENCOUNTER — TELEPHONE (OUTPATIENT)
Dept: OBSTETRICS AND GYNECOLOGY | Facility: CLINIC | Age: 49
End: 2024-07-09
Payer: COMMERCIAL

## 2024-07-09 ENCOUNTER — APPOINTMENT (OUTPATIENT)
Dept: RADIOLOGY | Facility: CLINIC | Age: 49
End: 2024-07-09
Payer: COMMERCIAL

## 2024-07-09 NOTE — TELEPHONE ENCOUNTER
Patient of Dr. Harris called stating that she has a mammogram on August 13th and wasn't sure if she should tell the techs or Dr Harris that she notices that around her period she gets a cyst on her right breast.

## 2024-07-10 DIAGNOSIS — Z12.31 SCREENING MAMMOGRAM FOR BREAST CANCER: ICD-10-CM

## 2024-07-10 DIAGNOSIS — N63.10 MASS OF RIGHT BREAST, UNSPECIFIED QUADRANT: ICD-10-CM

## 2024-07-11 ENCOUNTER — APPOINTMENT (OUTPATIENT)
Dept: HEMATOLOGY/ONCOLOGY | Facility: CLINIC | Age: 49
End: 2024-07-11
Payer: COMMERCIAL

## 2024-07-11 ENCOUNTER — HOSPITAL ENCOUNTER (OUTPATIENT)
Dept: RADIOLOGY | Facility: EXTERNAL LOCATION | Age: 49
Discharge: HOME | End: 2024-07-11

## 2024-07-15 ENCOUNTER — TELEPHONE (OUTPATIENT)
Dept: OBSTETRICS AND GYNECOLOGY | Facility: CLINIC | Age: 49
End: 2024-07-15
Payer: COMMERCIAL

## 2024-07-15 ENCOUNTER — HOSPITAL ENCOUNTER (OUTPATIENT)
Dept: RADIOLOGY | Facility: CLINIC | Age: 49
Discharge: HOME | End: 2024-07-15
Payer: COMMERCIAL

## 2024-07-15 VITALS — BODY MASS INDEX: 30.66 KG/M2 | WEIGHT: 207 LBS | HEIGHT: 69 IN

## 2024-07-15 DIAGNOSIS — N63.10 MASS OF RIGHT BREAST, UNSPECIFIED QUADRANT: ICD-10-CM

## 2024-07-15 DIAGNOSIS — N60.09 BENIGN BREAST CYST IN FEMALE, UNSPECIFIED LATERALITY: ICD-10-CM

## 2024-07-15 DIAGNOSIS — N60.09 BENIGN BREAST CYST IN FEMALE, UNSPECIFIED LATERALITY: Primary | ICD-10-CM

## 2024-07-15 PROCEDURE — 76642 ULTRASOUND BREAST LIMITED: CPT | Mod: RT

## 2024-07-15 PROCEDURE — 77066 DX MAMMO INCL CAD BI: CPT | Performed by: STUDENT IN AN ORGANIZED HEALTH CARE EDUCATION/TRAINING PROGRAM

## 2024-07-15 PROCEDURE — 77066 DX MAMMO INCL CAD BI: CPT

## 2024-07-15 PROCEDURE — 77062 BREAST TOMOSYNTHESIS BI: CPT | Performed by: STUDENT IN AN ORGANIZED HEALTH CARE EDUCATION/TRAINING PROGRAM

## 2024-07-15 PROCEDURE — 76642 ULTRASOUND BREAST LIMITED: CPT | Performed by: STUDENT IN AN ORGANIZED HEALTH CARE EDUCATION/TRAINING PROGRAM

## 2024-07-15 NOTE — TELEPHONE ENCOUNTER
Patient called stating that she found another boil starting on her left breast.  She has an appointment today 7/15/24 at 1:00.  She would like it to be a bilateral diagnostic.  If that can't be done can someone call and advise?

## 2024-07-16 ENCOUNTER — APPOINTMENT (OUTPATIENT)
Dept: PRIMARY CARE | Facility: CLINIC | Age: 49
End: 2024-07-16
Payer: COMMERCIAL

## 2024-07-24 ENCOUNTER — APPOINTMENT (OUTPATIENT)
Dept: PRIMARY CARE | Facility: CLINIC | Age: 49
End: 2024-07-24
Payer: COMMERCIAL

## 2024-07-24 VITALS — SYSTOLIC BLOOD PRESSURE: 116 MMHG | DIASTOLIC BLOOD PRESSURE: 70 MMHG

## 2024-07-24 DIAGNOSIS — K76.0 FATTY INFILTRATION OF LIVER: ICD-10-CM

## 2024-07-24 DIAGNOSIS — N60.09 MONTGOMERY GLAND CYST: ICD-10-CM

## 2024-07-24 DIAGNOSIS — Z00.00 ROUTINE GENERAL MEDICAL EXAMINATION AT A HEALTH CARE FACILITY: ICD-10-CM

## 2024-07-24 DIAGNOSIS — Z14.8 HEMOCHROMATOSIS CARRIER: ICD-10-CM

## 2024-07-24 DIAGNOSIS — R73.09 ELEVATED GLUCOSE: Primary | ICD-10-CM

## 2024-07-24 PROBLEM — R07.89 ATYPICAL CHEST PAIN: Status: RESOLVED | Noted: 2023-10-16 | Resolved: 2024-07-24

## 2024-07-24 PROBLEM — H61.23 IMPACTED CERUMEN OF BOTH EARS: Status: RESOLVED | Noted: 2023-07-12 | Resolved: 2024-07-24

## 2024-07-24 PROCEDURE — 1036F TOBACCO NON-USER: CPT | Performed by: INTERNAL MEDICINE

## 2024-07-24 PROCEDURE — 3044F HG A1C LEVEL LT 7.0%: CPT | Performed by: INTERNAL MEDICINE

## 2024-07-24 PROCEDURE — 3048F LDL-C <100 MG/DL: CPT | Performed by: INTERNAL MEDICINE

## 2024-07-24 PROCEDURE — 3074F SYST BP LT 130 MM HG: CPT | Performed by: INTERNAL MEDICINE

## 2024-07-24 PROCEDURE — 99214 OFFICE O/P EST MOD 30 MIN: CPT | Performed by: INTERNAL MEDICINE

## 2024-07-24 PROCEDURE — 3078F DIAST BP <80 MM HG: CPT | Performed by: INTERNAL MEDICINE

## 2024-07-24 PROCEDURE — 3062F POS MACROALBUMINURIA REV: CPT | Performed by: INTERNAL MEDICINE

## 2024-07-24 ASSESSMENT — PATIENT HEALTH QUESTIONNAIRE - PHQ9
1. LITTLE INTEREST OR PLEASURE IN DOING THINGS: NOT AT ALL
2. FEELING DOWN, DEPRESSED OR HOPELESS: NOT AT ALL
SUM OF ALL RESPONSES TO PHQ9 QUESTIONS 1 AND 2: 0

## 2024-07-24 NOTE — PROGRESS NOTES
Subjective   Patient ID: Mirna De La Vega is a 49 y.o. female who presents for Follow-up and followup mammogram.    HPI     Review of Systems    Objective   /70   LMP 07/04/2024 (Approximate)     Physical Exam      Lab Results   Component Value Date    WBC 8.2 06/14/2024    HGB 14.0 06/14/2024    HCT 44.0 06/14/2024     06/14/2024    CHOL 166 01/17/2024    TRIG 63 01/17/2024    HDL 57.5 01/17/2024    ALT 18 06/14/2024    AST 20 06/14/2024     06/14/2024    K 4.3 06/14/2024     06/14/2024    CREATININE 1.17 (H) 06/14/2024    BUN 22 06/14/2024    CO2 26 06/14/2024    TSH 1.06 07/27/2023    HGBA1C 6.4 (H) 01/17/2024     Narrative & Impression   Interpreted By:  Dang Cho,   STUDY:  BI MAMMO BILATERAL DIAGNOSTIC TOMOSYNTHESIS; BI US BREAST LIMITED  RIGHT;  7/15/2024 2:01 pm; 7/15/2024 2:05 pm      ACCESSION NUMBER(S):  [id number]; [id number]      ORDERING CLINICIAN:  YRIS GOOD      INDICATION:  Skin changes in the right nipple associated with yellow and  occasional bloody drainage from the skin lesion, recent skin changes  in the left nipple.      COMPARISON:  08/01/2023, 07/20/2022.      FINDINGS:  MAMMOGRAPHY: 2D and tomosynthesis images were reviewed at 1 mm slice  thickness.      Density:  The breast tissue is heterogeneously dense, which may  obscure small masses.      A prominent Douglas gland is noted along the central lateral  aspect of the right nipple. No suspicious masses or calcifications  are identified in either breast.      ULTRASOUND: Targeted ultrasound was performed by a registered  sonographer for remote interpretation in bilateral retroareolar  region. An irregular hypoechoic mass within the skin of the right  nipple is identified measuring 0.5 x 0.3 x 0.4 cm with a connection  to the skin surface and the site of drainage as reported by the  patient and the technologist. No ductal dilatation or suspicious  sonographic finding is identified  in the retroareolar region on  either side.      IMPRESSION:  1. Right-greater-than-left skin changes involving the nipples without  mammographic or sonographic abnormality likely inflammation of  Douglas glands. Consultation at breast surgery clinic is  recommended for further evaluation and management. Patient was  provided with the phone number to breast clinic for consultation.      2. No mammographic evidence of malignancy in either breast.      BI-RADS CATEGORY:  BI-RADS Category:  2 Benign.  Recommendation:  Surgical Consultation.  Recommended Date:  Immediate.  Laterality:  Bilateral.     Assessment/Plan       #1 elevated Cr/ckd-  (Genetics +PKD)reviewed.  Stable.  Reviewed no NSAIDs.  Blood pressure controlled.  Sugar control.   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 retest iron next visit  #4 fhx DVT- father. s/p heme eval  #5 fatty liver- reviewed. diet/exercise. no EtOH   #6 dyspepsia- f/u  GI  #7 IFBS- diet/exercise reviewed.  Labs.   #8 h/o [id number] neurology  #9 light-chain- f/u  heme.   #10 breast

## 2024-07-30 ENCOUNTER — APPOINTMENT (OUTPATIENT)
Dept: HEMATOLOGY/ONCOLOGY | Facility: CLINIC | Age: 49
End: 2024-07-30
Payer: COMMERCIAL

## 2024-08-01 ENCOUNTER — LAB (OUTPATIENT)
Dept: LAB | Facility: LAB | Age: 49
End: 2024-08-01
Payer: COMMERCIAL

## 2024-08-01 DIAGNOSIS — R73.09 ELEVATED GLUCOSE: ICD-10-CM

## 2024-08-01 PROCEDURE — 83036 HEMOGLOBIN GLYCOSYLATED A1C: CPT

## 2024-08-01 PROCEDURE — 36415 COLL VENOUS BLD VENIPUNCTURE: CPT

## 2024-08-02 LAB
EST. AVERAGE GLUCOSE BLD GHB EST-MCNC: 143 MG/DL
HBA1C MFR BLD: 6.6 %

## 2024-08-08 ENCOUNTER — INFUSION (OUTPATIENT)
Dept: HEMATOLOGY/ONCOLOGY | Facility: CLINIC | Age: 49
End: 2024-08-08
Payer: COMMERCIAL

## 2024-08-08 ENCOUNTER — EDUCATION (OUTPATIENT)
Dept: HEMATOLOGY/ONCOLOGY | Facility: CLINIC | Age: 49
End: 2024-08-08

## 2024-08-08 ENCOUNTER — TELEPHONE (OUTPATIENT)
Dept: DERMATOLOGY | Facility: CLINIC | Age: 49
End: 2024-08-08

## 2024-08-08 ENCOUNTER — OFFICE VISIT (OUTPATIENT)
Dept: HEMATOLOGY/ONCOLOGY | Facility: CLINIC | Age: 49
End: 2024-08-08
Payer: COMMERCIAL

## 2024-08-08 VITALS
DIASTOLIC BLOOD PRESSURE: 86 MMHG | OXYGEN SATURATION: 97 % | RESPIRATION RATE: 16 BRPM | HEART RATE: 78 BPM | SYSTOLIC BLOOD PRESSURE: 121 MMHG | TEMPERATURE: 97.3 F | BODY MASS INDEX: 30.13 KG/M2 | WEIGHT: 204.15 LBS

## 2024-08-08 DIAGNOSIS — Z14.8 HEMOCHROMATOSIS CARRIER: ICD-10-CM

## 2024-08-08 DIAGNOSIS — R71.8 ELEVATED HEMATOCRIT: ICD-10-CM

## 2024-08-08 DIAGNOSIS — D75.1 POLYCYTHEMIA: ICD-10-CM

## 2024-08-08 LAB
BASOPHILS # BLD AUTO: 0.03 X10*3/UL (ref 0–0.1)
BASOPHILS NFR BLD AUTO: 0.4 %
EOSINOPHIL # BLD AUTO: 0.09 X10*3/UL (ref 0–0.7)
EOSINOPHIL NFR BLD AUTO: 1.2 %
ERYTHROCYTE [DISTWIDTH] IN BLOOD BY AUTOMATED COUNT: 14.6 % (ref 11.5–14.5)
HCT VFR BLD AUTO: 48.2 % (ref 36–46)
HGB BLD-MCNC: 14.9 G/DL (ref 12–16)
IMM GRANULOCYTES # BLD AUTO: 0.02 X10*3/UL (ref 0–0.7)
IMM GRANULOCYTES NFR BLD AUTO: 0.3 % (ref 0–0.9)
LYMPHOCYTES # BLD AUTO: 0.78 X10*3/UL (ref 1.2–4.8)
LYMPHOCYTES NFR BLD AUTO: 10.3 %
MCH RBC QN AUTO: 28 PG (ref 26–34)
MCHC RBC AUTO-ENTMCNC: 30.9 G/DL (ref 32–36)
MCV RBC AUTO: 90 FL (ref 80–100)
MONOCYTES # BLD AUTO: 0.56 X10*3/UL (ref 0.1–1)
MONOCYTES NFR BLD AUTO: 7.4 %
NEUTROPHILS # BLD AUTO: 6.08 X10*3/UL (ref 1.2–7.7)
NEUTROPHILS NFR BLD AUTO: 80.4 %
NRBC BLD-RTO: ABNORMAL /100{WBCS}
PLATELET # BLD AUTO: 278 X10*3/UL (ref 150–450)
RBC # BLD AUTO: 5.33 X10*6/UL (ref 4–5.2)
WBC # BLD AUTO: 7.6 X10*3/UL (ref 4.4–11.3)

## 2024-08-08 PROCEDURE — 3048F LDL-C <100 MG/DL: CPT | Performed by: INTERNAL MEDICINE

## 2024-08-08 PROCEDURE — 84075 ASSAY ALKALINE PHOSPHATASE: CPT

## 2024-08-08 PROCEDURE — 36415 COLL VENOUS BLD VENIPUNCTURE: CPT

## 2024-08-08 PROCEDURE — 3044F HG A1C LEVEL LT 7.0%: CPT | Performed by: INTERNAL MEDICINE

## 2024-08-08 PROCEDURE — 99213 OFFICE O/P EST LOW 20 MIN: CPT | Performed by: INTERNAL MEDICINE

## 2024-08-08 PROCEDURE — 82728 ASSAY OF FERRITIN: CPT

## 2024-08-08 PROCEDURE — 3062F POS MACROALBUMINURIA REV: CPT | Performed by: INTERNAL MEDICINE

## 2024-08-08 PROCEDURE — 83540 ASSAY OF IRON: CPT

## 2024-08-08 PROCEDURE — 3074F SYST BP LT 130 MM HG: CPT | Performed by: INTERNAL MEDICINE

## 2024-08-08 PROCEDURE — 3079F DIAST BP 80-89 MM HG: CPT | Performed by: INTERNAL MEDICINE

## 2024-08-08 PROCEDURE — 85025 COMPLETE CBC W/AUTO DIFF WBC: CPT

## 2024-08-08 RX ORDER — HEPARIN SODIUM,PORCINE/PF 10 UNIT/ML
50 SYRINGE (ML) INTRAVENOUS AS NEEDED
OUTPATIENT
Start: 2024-08-08

## 2024-08-08 RX ORDER — ALBUTEROL SULFATE 0.83 MG/ML
3 SOLUTION RESPIRATORY (INHALATION) AS NEEDED
OUTPATIENT
Start: 2024-08-08

## 2024-08-08 RX ORDER — DIPHENHYDRAMINE HYDROCHLORIDE 50 MG/ML
50 INJECTION INTRAMUSCULAR; INTRAVENOUS AS NEEDED
OUTPATIENT
Start: 2024-08-08

## 2024-08-08 RX ORDER — EPINEPHRINE 0.3 MG/.3ML
0.3 INJECTION SUBCUTANEOUS EVERY 5 MIN PRN
OUTPATIENT
Start: 2024-08-08

## 2024-08-08 RX ORDER — HEPARIN 100 UNIT/ML
500 SYRINGE INTRAVENOUS AS NEEDED
OUTPATIENT
Start: 2024-08-08

## 2024-08-08 RX ORDER — FAMOTIDINE 10 MG/ML
20 INJECTION INTRAVENOUS ONCE AS NEEDED
OUTPATIENT
Start: 2024-08-08

## 2024-08-08 ASSESSMENT — PAIN SCALES - GENERAL: PAINLEVEL: 0-NO PAIN

## 2024-08-08 NOTE — TELEPHONE ENCOUNTER
Pt contacted office and states that she has signed a release of medical information from [facility] to release records to  Dermatology.  Once these records are received please scan into chart.     [place] LPN

## 2024-08-08 NOTE — PROGRESS NOTES
"Patient seen by Dr. Ramirez today in clinic. Reinforcement education provided regarding next steps with plan of care.      PER DR. RAMIREZ'S FUV NOTE TODAY: \"The patient had come for follow-up on August 7, 2024 discussed in detail with the patient.  The patient is currently premenopausal and has regular menstrual period.  If desires could consider blood donation every 56 days.  If not consider therapeutic phlebotomies every 4 weeks to maintain hemoglobin at or under 15 g/dL.  Siblings and children need to be tested for the same.  Hemoglobin 14 g/dL today.  Hold phlebotomy today.  Return in 6 months.   Elevated free kappa light chains 2.14 mg/dL, bone survey negative/MGUS.   To be followed clinically.\"    Per verbal order pt will have phlebotomy every 3 mos and see Dr. Ramirez in 6 mos.  Pt agreeable.  Pt desires labs to be drawn at same time as phlebotomy. Patient verbalizes understanding of plan of care via teachback method.             "

## 2024-08-08 NOTE — PROGRESS NOTES
Patient ID: Mirna De La Vega is a 49 y.o. female.  Referring Physician: Jarvis Manzano MD  [address] [facility], [place] [address]  Primary Care Provider: Ludivina Luis MD  Visit Type: Initial Visit  The patient was referred to me for further evaluation and management of elevated free kappa light chains at 2.16 mg/dL reference range 0.33 to 1.94 mg/dL.  Serum protein electrophoresis did not reveal M protein.  Bone survey did not reveal any osteolytic lesions.  MGUS.    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  H63D was not detected.  Subjective    HPI  The patient is a 48-year-old woman with past medical history of meningioma, s/p resection, epilepsy, elevated creatinine.  The patient is asymptomatic.  She was evaluated for elevated creatinine at 1.26 mg/dL and hyperglycemia.  As a part of evaluation she was noted to have elevated free kappa light chains and was referred for further evaluation and management.    At interview on 2023 the patient denied history of weight loss, fevers, night sweats, bone pains, constipation, confusion, chest pain, shortness of breath, nausea, vomiting, hematemesis, melena, hematochezia and hematuria.    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  The patient is asymptomatic.    Past medical history:    History of meningioma s/p resection, history of epilepsy, well-controlled, history of carrier/mutation for hemochromatosis the patient does not recall details.    Past surgical history:    History of , hernia repair, left frontal lobe resection for meningioma, status postcraniotomy.    Family history:    Mother had eczema diabetes, father had lipidemia and thyroid cancer.  Brother has anxiety depression and astrocytoma.  Maternal uncle had alcohol dependence.    Personal history and social  history:    48 years old,  has 1 daughter Works at [employer].  Denies history of smoking alcohol abuse drug abuse.    Review of Systems   All other systems reviewed and are negative.       Objective   BSA: 2.12 meters squared  /86   Pulse 78   Temp 36.3 °C (97.3 °F) (Temporal)   Resp 16   Wt 92.6 kg (204 lb 2.3 oz)   LMP 2024 (Approximate)   SpO2 97%   BMI 30.13 kg/m²      has a past medical history of Carbuncle, unspecified (2013), Epidermal cyst (2013), Essential (primary) hypertension (2013), Gross hematuria (2013), Other fatigue (2013), Paronychia, finger (10/16/2023), and Personal history of other specified conditions (10/16/2018).   has a past surgical history that includes Hernia repair (2018); Other surgical history (2016); and  section, low transverse.  Family History   Problem Relation Name Age of Onset    Diabetes Mother      Hyperlipidemia Mother      Deep vein thrombosis Father      Diabetes Father      Hyperlipidemia Father      Leukemia Father      Other (thymus cancer) Father      Myelodysplastic syndrome Father      Other (astrocytoma) Brother      Hemochromatosis Brother       Oncology History    No history exists.       Mirna MACIEL Ceferino  reports that she has never smoked. She has never used smokeless tobacco.  She  reports no history of alcohol use.  She  reports no history of drug use.    Physical Exam  Constitutional:       Appearance: Normal appearance.   HENT:      Head: Normocephalic and atraumatic.      Nose: Nose normal.      Mouth/Throat:      Mouth: Mucous membranes are moist.      Pharynx: Oropharynx is clear.   Eyes:      Extraocular Movements: Extraocular movements intact.      Conjunctiva/sclera: Conjunctivae normal.      Pupils: Pupils are equal, round, and reactive to light.   Cardiovascular:      Rate and Rhythm: Normal rate and regular rhythm.   Pulmonary:      Effort: Pulmonary effort is  normal.      Breath sounds: Normal breath sounds.   Abdominal:      General: Abdomen is flat. Bowel sounds are normal.      Palpations: Abdomen is soft.   Musculoskeletal:         General: Normal range of motion.      Cervical back: Normal range of motion and neck supple.   Neurological:      General: No focal deficit present.      Mental Status: She is alert and oriented to person, place, and time. Mental status is at baseline.   Psychiatric:         Mood and Affect: Mood normal.         Behavior: Behavior normal.         Thought Content: Thought content normal.         Judgment: Judgment normal.         WBC   Date/Time Value Ref Range Status   08/08/2024 10:30 AM 7.6 4.4 - 11.3 x10*3/uL Final   06/14/2024 02:15 PM 8.2 4.4 - 11.3 x10*3/uL Final   05/17/2024 02:09 PM 9.5 4.4 - 11.3 x10*3/uL Final     nRBC   Date Value Ref Range Status   08/08/2024   Final     Comment:     Not Measured   06/14/2024   Final     Comment:     Not Measured   05/17/2024   Final     Comment:     Not Measured     RBC   Date Value Ref Range Status   08/08/2024 5.33 (H) 4.00 - 5.20 x10*6/uL Final   06/14/2024 4.90 4.00 - 5.20 x10*6/uL Final   05/17/2024 5.05 4.00 - 5.20 x10*6/uL Final     Hemoglobin   Date Value Ref Range Status   08/08/2024 14.9 12.0 - 16.0 g/dL Final   06/14/2024 14.0 12.0 - 16.0 g/dL Final   05/17/2024 14.3 12.0 - 16.0 g/dL Final     Hematocrit   Date Value Ref Range Status   08/08/2024 48.2 (H) 36.0 - 46.0 % Final   06/14/2024 44.0 36.0 - 46.0 % Final   05/17/2024 46.7 (H) 36.0 - 46.0 % Final     MCV   Date/Time Value Ref Range Status   08/08/2024 10:30 AM 90 80 - 100 fL Final   06/14/2024 02:15 PM 90 80 - 100 fL Final   05/17/2024 02:09 PM 93 80 - 100 fL Final     MCH   Date/Time Value Ref Range Status   08/08/2024 10:30 AM 28.0 26.0 - 34.0 pg Final   06/14/2024 02:15 PM 28.6 26.0 - 34.0 pg Final   05/17/2024 02:09 PM 28.3 26.0 - 34.0 pg Final     MCHC   Date/Time Value Ref Range Status   08/08/2024 10:30 AM 30.9 (L)  "32.0 - 36.0 g/dL Final   06/14/2024 02:15 PM 31.8 (L) 32.0 - 36.0 g/dL Final   05/17/2024 02:09 PM 30.6 (L) 32.0 - 36.0 g/dL Final     RDW   Date/Time Value Ref Range Status   08/08/2024 10:30 AM 14.6 (H) 11.5 - 14.5 % Final   06/14/2024 02:15 PM 13.9 11.5 - 14.5 % Final   05/17/2024 02:09 PM 13.5 11.5 - 14.5 % Final     Platelets   Date/Time Value Ref Range Status   08/08/2024 10:30  150 - 450 x10*3/uL Final   06/14/2024 02:15  150 - 450 x10*3/uL Final   05/17/2024 02:09  150 - 450 x10*3/uL Final     No results found for: \"MPV\"  Neutrophils %   Date/Time Value Ref Range Status   08/08/2024 10:30 AM 80.4 40.0 - 80.0 % Final   06/14/2024 02:15 PM 82.6 40.0 - 80.0 % Final   05/17/2024 02:09 PM 81.4 40.0 - 80.0 % Final     Immature Granulocytes %, Automated   Date/Time Value Ref Range Status   08/08/2024 10:30 AM 0.3 0.0 - 0.9 % Final     Comment:     Immature Granulocyte Count (IG) includes promyelocytes, myelocytes and metamyelocytes but does not include bands. Percent differential counts (%) should be interpreted in the context of the absolute cell counts (cells/UL).   06/14/2024 02:15 PM 0.2 0.0 - 0.9 % Final     Comment:     Immature Granulocyte Count (IG) includes promyelocytes, myelocytes and metamyelocytes but does not include bands. Percent differential counts (%) should be interpreted in the context of the absolute cell counts (cells/UL).   05/17/2024 02:09 PM 0.2 0.0 - 0.9 % Final     Comment:     Immature Granulocyte Count (IG) includes promyelocytes, myelocytes and metamyelocytes but does not include bands. Percent differential counts (%) should be interpreted in the context of the absolute cell counts (cells/UL).     Lymphocytes %   Date/Time Value Ref Range Status   08/08/2024 10:30 AM 10.3 13.0 - 44.0 % Final   06/14/2024 02:15 PM 11.3 13.0 - 44.0 % Final   05/17/2024 02:09 PM 9.6 13.0 - 44.0 % Final     Monocytes %   Date/Time Value Ref Range Status   08/08/2024 10:30 AM 7.4 2.0 - " 10.0 % Final   06/14/2024 02:15 PM 4.7 2.0 - 10.0 % Final   05/17/2024 02:09 PM 5.3 2.0 - 10.0 % Final     Eosinophils %   Date/Time Value Ref Range Status   08/08/2024 10:30 AM 1.2 0.0 - 6.0 % Final   06/14/2024 02:15 PM 1.0 0.0 - 6.0 % Final   05/17/2024 02:09 PM 3.2 0.0 - 6.0 % Final     Basophils %   Date/Time Value Ref Range Status   08/08/2024 10:30 AM 0.4 0.0 - 2.0 % Final   06/14/2024 02:15 PM 0.2 0.0 - 2.0 % Final   05/17/2024 02:09 PM 0.3 0.0 - 2.0 % Final     Neutrophils Absolute   Date/Time Value Ref Range Status   08/08/2024 10:30 AM 6.08 1.20 - 7.70 x10*3/uL Final     Comment:     Percent differential counts (%) should be interpreted in the context of the absolute cell counts (cells/uL).   06/14/2024 02:15 PM 6.79 1.20 - 7.70 x10*3/uL Final     Comment:     Percent differential counts (%) should be interpreted in the context of the absolute cell counts (cells/uL).   05/17/2024 02:09 PM 7.69 1.20 - 7.70 x10*3/uL Final     Comment:     Percent differential counts (%) should be interpreted in the context of the absolute cell counts (cells/uL).     Immature Granulocytes Absolute, Automated   Date/Time Value Ref Range Status   08/08/2024 10:30 AM 0.02 0.00 - 0.70 x10*3/uL Final   06/14/2024 02:15 PM 0.02 0.00 - 0.70 x10*3/uL Final   05/17/2024 02:09 PM 0.02 0.00 - 0.70 x10*3/uL Final     Lymphocytes Absolute   Date/Time Value Ref Range Status   08/08/2024 10:30 AM 0.78 (L) 1.20 - 4.80 x10*3/uL Final   06/14/2024 02:15 PM 0.93 (L) 1.20 - 4.80 x10*3/uL Final   05/17/2024 02:09 PM 0.91 (L) 1.20 - 4.80 x10*3/uL Final     Monocytes Absolute   Date/Time Value Ref Range Status   08/08/2024 10:30 AM 0.56 0.10 - 1.00 x10*3/uL Final   06/14/2024 02:15 PM 0.39 0.10 - 1.00 x10*3/uL Final   05/17/2024 02:09 PM 0.50 0.10 - 1.00 x10*3/uL Final     Eosinophils Absolute   Date/Time Value Ref Range Status   08/08/2024 10:30 AM 0.09 0.00 - 0.70 x10*3/uL Final   06/14/2024 02:15 PM 0.08 0.00 - 0.70 x10*3/uL Final   05/17/2024  "02:09 PM 0.30 0.00 - 0.70 x10*3/uL Final     Basophils Absolute   Date/Time Value Ref Range Status   08/08/2024 10:30 AM 0.03 0.00 - 0.10 x10*3/uL Final   06/14/2024 02:15 PM 0.02 0.00 - 0.10 x10*3/uL Final   05/17/2024 02:09 PM 0.03 0.00 - 0.10 x10*3/uL Final       No components found for: \"PT\"  No results found for: \"APTT\"    Assessment/Plan    The patient is a 48-year-old woman with past medical history of meningioma, s/p resection, epilepsy, elevated creatinine.  The patient is asymptomatic.  She was evaluated for elevated creatinine at 1.26 mg/dL and hyperglycemia.  As a part of evaluation she was noted to have elevated free kappa light chains and was referred for further evaluation and management.  Physical examination was within normal limits.  Reviewed lab data with the patient.  Elevated free kappa light chain occurs in less than 1% of population below the age of 60.  I have recommended a bone survey.  If there are no osteolytic lesions that pretty much rules out multiple myeloma.  However, to be certain 1 could consider a bone marrow aspiration and biopsy.  The patient would like to think about it.  There are no therapeutic recommendations today.  Thank you for allowing me to participate in care of your patient if you have any questions please feel free to call me.    Heterozygous c.845G>A (p.C282Y) mutation in HFE gene was detected but negative for H63D mutation:    The patient had come for follow-up on August 7, 2024 discussed in detail with the patient.  The patient is currently premenopausal and has regular menstrual period.  If desires could consider blood donation every 56 days.  If not consider therapeutic phlebotomies every 4 weeks to maintain hemoglobin at or under 15 g/dL.  Siblings and children need to be tested for the same.  Hemoglobin 14 g/dL today.  Hold phlebotomy today.  Return in 6 months.    Elevated free kappa light chains 2.14 mg/dL, bone survey negative/MGUS.    To be followed " clinically.    Return in 6 months.     Diagnoses and all orders for this visit:  Elevated hematocrit  -     Referral to Hematology and Oncology  -     Clinic Appointment Request; Future         Jarvis Manzano MD

## 2024-08-08 NOTE — PROGRESS NOTES
Patient is here today for phleb, labs drawn- hgb 14.9 today, no phleb per parameters.  Call back instructions reviewed.    Patient verbalizes understanding of plan of care.  Ambulated off unit without difficulty, steady gait.

## 2024-08-09 ENCOUNTER — OFFICE VISIT (OUTPATIENT)
Dept: URGENT CARE | Facility: CLINIC | Age: 49
End: 2024-08-09
Payer: COMMERCIAL

## 2024-08-09 VITALS
TEMPERATURE: 98.7 F | DIASTOLIC BLOOD PRESSURE: 92 MMHG | OXYGEN SATURATION: 97 % | WEIGHT: 204 LBS | HEART RATE: 73 BPM | SYSTOLIC BLOOD PRESSURE: 136 MMHG | BODY MASS INDEX: 30.11 KG/M2

## 2024-08-09 DIAGNOSIS — J01.20 SUBACUTE ETHMOIDAL SINUSITIS: Primary | ICD-10-CM

## 2024-08-09 DIAGNOSIS — B37.9 ANTIBIOTIC-INDUCED YEAST INFECTION: ICD-10-CM

## 2024-08-09 DIAGNOSIS — J30.89 ENVIRONMENTAL AND SEASONAL ALLERGIES: ICD-10-CM

## 2024-08-09 DIAGNOSIS — T36.95XA ANTIBIOTIC-INDUCED YEAST INFECTION: ICD-10-CM

## 2024-08-09 LAB
ALBUMIN SERPL BCP-MCNC: 4.3 G/DL (ref 3.4–5)
ALP SERPL-CCNC: 66 U/L (ref 33–110)
ALT SERPL W P-5'-P-CCNC: 18 U/L (ref 7–45)
ANION GAP SERPL CALC-SCNC: 15 MMOL/L (ref 10–20)
AST SERPL W P-5'-P-CCNC: 19 U/L (ref 9–39)
BILIRUB SERPL-MCNC: 0.4 MG/DL (ref 0–1.2)
BUN SERPL-MCNC: 18 MG/DL (ref 6–23)
CALCIUM SERPL-MCNC: 9.8 MG/DL (ref 8.6–10.6)
CHLORIDE SERPL-SCNC: 103 MMOL/L (ref 98–107)
CO2 SERPL-SCNC: 27 MMOL/L (ref 21–32)
CREAT SERPL-MCNC: 1.34 MG/DL (ref 0.5–1.05)
EGFRCR SERPLBLD CKD-EPI 2021: 49 ML/MIN/1.73M*2
FERRITIN SERPL-MCNC: 47 NG/ML (ref 8–150)
GLUCOSE SERPL-MCNC: 80 MG/DL (ref 74–99)
IRON SATN MFR SERPL: 17 % (ref 25–45)
IRON SERPL-MCNC: 58 UG/DL (ref 35–150)
POTASSIUM SERPL-SCNC: 4.1 MMOL/L (ref 3.5–5.3)
PROT SERPL-MCNC: 7.3 G/DL (ref 6.4–8.2)
SODIUM SERPL-SCNC: 141 MMOL/L (ref 136–145)
TIBC SERPL-MCNC: 347 UG/DL (ref 240–445)
UIBC SERPL-MCNC: 289 UG/DL (ref 110–370)

## 2024-08-09 PROCEDURE — 3048F LDL-C <100 MG/DL: CPT

## 2024-08-09 PROCEDURE — 3062F POS MACROALBUMINURIA REV: CPT

## 2024-08-09 PROCEDURE — 3075F SYST BP GE 130 - 139MM HG: CPT

## 2024-08-09 PROCEDURE — 3044F HG A1C LEVEL LT 7.0%: CPT

## 2024-08-09 PROCEDURE — 99213 OFFICE O/P EST LOW 20 MIN: CPT

## 2024-08-09 PROCEDURE — 3080F DIAST BP >= 90 MM HG: CPT

## 2024-08-09 RX ORDER — FLUCONAZOLE 150 MG/1
TABLET ORAL
Qty: 2 TABLET | Refills: 0 | Status: SHIPPED | OUTPATIENT
Start: 2024-08-09

## 2024-08-09 RX ORDER — BROMPHENIRAMINE MALEATE, PSEUDOEPHEDRINE HYDROCHLORIDE, AND DEXTROMETHORPHAN HYDROBROMIDE 2; 30; 10 MG/5ML; MG/5ML; MG/5ML
10 SYRUP ORAL 4 TIMES DAILY PRN
Qty: 120 ML | Refills: 0 | Status: SHIPPED | OUTPATIENT
Start: 2024-08-09 | End: 2024-08-19

## 2024-08-09 RX ORDER — AMOXICILLIN AND CLAVULANATE POTASSIUM 875; 125 MG/1; MG/1
1 TABLET, FILM COATED ORAL 2 TIMES DAILY
Qty: 14 TABLET | Refills: 0 | Status: SHIPPED | OUTPATIENT
Start: 2024-08-09 | End: 2024-08-16

## 2024-08-09 ASSESSMENT — ENCOUNTER SYMPTOMS
SORE THROAT: 1
FEVER: 0
GASTROINTESTINAL NEGATIVE: 1
CARDIOVASCULAR NEGATIVE: 1
FATIGUE: 0
HEADACHES: 1
MYALGIAS: 0
TROUBLE SWALLOWING: 0
RHINORRHEA: 1
FACIAL SWELLING: 0
CHILLS: 0
CONSTITUTIONAL NEGATIVE: 1
LIGHT-HEADEDNESS: 0
EYE ITCHING: 1
DIAPHORESIS: 0
PALPITATIONS: 0
VOMITING: 0
CHEST TIGHTNESS: 0
VOICE CHANGE: 0
WEAKNESS: 0
DIARRHEA: 0
SINUS PRESSURE: 1
ABDOMINAL PAIN: 0
MUSCULOSKELETAL NEGATIVE: 1
LOSS OF CONSCIOUSNESS: 0
DIZZINESS: 0
SINUS PAIN: 1
SINUS COMPLAINT: 1
SHORTNESS OF BREATH: 0
NAUSEA: 0
WHEEZING: 0
COUGH: 1
ARTHRALGIAS: 0

## 2024-08-09 NOTE — PROGRESS NOTES
Subjective   History  Mirna De La Vega is a 49 y.o. female who presents for Sinus Problem.    Patient presents with sinus congestion, sinus pressure, and itching eyes/ears/throat for the last week. She reports a history of sinus infections and frequent sinusitis related to weather changes and environmental allergies. 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       History provided by:  Patient and medical records  Sinus Problem  Severity:  Mild  Onset quality:  Gradual  Duration:  1 week  Chronicity:  Recurrent  Associated symptoms: congestion, cough (from drainage), headaches, rhinorrhea and sore throat    Associated symptoms: no abdominal pain, no chest pain, no diarrhea, no ear pain, no fatigue, no fever, no loss of consciousness, no myalgias, no nausea, no rash, no shortness of breath, no vomiting and no wheezing      Past Surgical History:   Procedure Laterality Date     SECTION, LOW TRANSVERSE      HERNIA REPAIR  2018    Inguinal Hernia Repair    OTHER SURGICAL HISTORY  2016    Brain Surgery     Social History     Tobacco Use    Smoking status: Never    Smokeless tobacco: Never   Vaping Use    Vaping status: Never Used   Substance Use Topics    Alcohol use: Never    Drug use: Never       Review of Systems   Review of Systems   Constitutional: Negative.  Negative for chills, diaphoresis, fatigue and fever.   HENT:  Positive for congestion, postnasal drip, rhinorrhea, sinus pressure, sinus pain and sore throat. Negative for dental problem, ear pain, facial swelling, tinnitus, trouble swallowing and voice change.    Eyes:  Positive for itching.   Respiratory:  Positive for cough (from drainage). Negative for chest tightness, shortness of breath and wheezing.    Cardiovascular: Negative.  Negative for chest pain and palpitations.   Gastrointestinal: Negative.  Negative for abdominal pain, diarrhea, nausea and  vomiting.   Musculoskeletal: Negative.  Negative for arthralgias and myalgias.   Skin: Negative.  Negative for rash.   Allergic/Immunologic: Positive for environmental allergies.   Neurological:  Positive for headaches. Negative for dizziness, loss of consciousness, weakness and light-headedness.       Objective   Vital Signs  BP (!) 136/92   Pulse 73   Temp 37.1 °C (98.7 °F)   Wt 92.5 kg (204 lb)   LMP 07/04/2024 (Approximate)   SpO2 97%   BMI 30.11 kg/m²    All vitals have been reviewed and are stable.     Physical Exam  Physical Exam  Vitals and nursing note reviewed.   Constitutional:       General: She is not in acute distress.     Appearance: Normal appearance. She is ill-appearing.   HENT:      Head: Normocephalic and atraumatic. No right periorbital erythema or left periorbital erythema.      Jaw: There is normal jaw occlusion.      Right Ear: External ear normal.      Left Ear: External ear normal.      Nose: Mucosal edema, congestion and rhinorrhea present.      Right Sinus: Frontal sinus tenderness present.      Left Sinus: Frontal sinus tenderness present.      Mouth/Throat:      Lips: Pink. No lesions.      Mouth: Mucous membranes are moist.      Palate: No lesions.      Pharynx: Uvula midline. Posterior oropharyngeal erythema and postnasal drip present. No uvula swelling.      Tonsils: No tonsillar exudate.   Eyes:      General: Lids are normal. Vision grossly intact.         Right eye: No discharge.         Left eye: No discharge.      Extraocular Movements: Extraocular movements intact.      Conjunctiva/sclera: Conjunctivae normal.      Right eye: Right conjunctiva is not injected.      Left eye: Left conjunctiva is not injected.      Pupils: Pupils are equal, round, and reactive to light.   Cardiovascular:      Rate and Rhythm: Normal rate and regular rhythm.      Heart sounds: Normal heart sounds.   Pulmonary:      Effort: Pulmonary effort is normal. No tachypnea, accessory muscle usage or  respiratory distress.      Breath sounds: Normal breath sounds and air entry. No stridor. No wheezing, rhonchi or rales.   Abdominal:      General: Abdomen is flat.      Palpations: Abdomen is soft.   Musculoskeletal:         General: Normal range of motion.      Cervical back: Full passive range of motion without pain, normal range of motion and neck supple.   Lymphadenopathy:      Cervical: No cervical adenopathy.   Skin:     General: Skin is warm and dry.      Coloration: Skin is not pale or sallow.      Findings: No erythema, petechiae or rash.   Neurological:      General: No focal deficit present.      Mental Status: She is alert and oriented to person, place, and time. Mental status is at baseline.   Psychiatric:         Mood and Affect: Mood normal.         Behavior: Behavior normal.         Diagnostic Results   Recent Results (from the past 48 hour(s))   CBC and Auto Differential    Collection Time: 08/08/24 10:30 AM   Result Value Ref Range    WBC 7.6 4.4 - 11.3 x10*3/uL    nRBC      RBC 5.33 (H) 4.00 - 5.20 x10*6/uL    Hemoglobin 14.9 12.0 - 16.0 g/dL    Hematocrit 48.2 (H) 36.0 - 46.0 %    MCV 90 80 - 100 fL    MCH 28.0 26.0 - 34.0 pg    MCHC 30.9 (L) 32.0 - 36.0 g/dL    RDW 14.6 (H) 11.5 - 14.5 %    Platelets 278 150 - 450 x10*3/uL    Neutrophils % 80.4 40.0 - 80.0 %    Immature Granulocytes %, Automated 0.3 0.0 - 0.9 %    Lymphocytes % 10.3 13.0 - 44.0 %    Monocytes % 7.4 2.0 - 10.0 %    Eosinophils % 1.2 0.0 - 6.0 %    Basophils % 0.4 0.0 - 2.0 %    Neutrophils Absolute 6.08 1.20 - 7.70 x10*3/uL    Immature Granulocytes Absolute, Automated 0.02 0.00 - 0.70 x10*3/uL    Lymphocytes Absolute 0.78 (L) 1.20 - 4.80 x10*3/uL    Monocytes Absolute 0.56 0.10 - 1.00 x10*3/uL    Eosinophils Absolute 0.09 0.00 - 0.70 x10*3/uL    Basophils Absolute 0.03 0.00 - 0.10 x10*3/uL   Comprehensive Metabolic Panel    Collection Time: 08/08/24 10:30 AM   Result Value Ref Range    Glucose 80 74 - 99 mg/dL    Sodium 141 136  - 145 mmol/L    Potassium 4.1 3.5 - 5.3 mmol/L    Chloride 103 98 - 107 mmol/L    Bicarbonate 27 21 - 32 mmol/L    Anion Gap 15 10 - 20 mmol/L    Urea Nitrogen 18 6 - 23 mg/dL    Creatinine 1.34 (H) 0.50 - 1.05 mg/dL    eGFR 49 (L) >60 mL/min/1.73m*2    Calcium 9.8 8.6 - 10.6 mg/dL    Albumin 4.3 3.4 - 5.0 g/dL    Alkaline Phosphatase 66 33 - 110 U/L    Total Protein 7.3 6.4 - 8.2 g/dL    AST 19 9 - 39 U/L    Bilirubin, Total 0.4 0.0 - 1.2 mg/dL    ALT 18 7 - 45 U/L   Ferritin    Collection Time: 08/08/24 10:30 AM   Result Value Ref Range    Ferritin 47 8 - 150 ng/mL   Iron and TIBC    Collection Time: 08/08/24 10:30 AM   Result Value Ref Range    Iron 58 35 - 150 ug/dL    UIBC 289 110 - 370 ug/dL    TIBC 347 240 - 445 ug/dL    % Saturation 17 (L) 25 - 45 %       Assessment/Plan   Procedures   N/A    Problem List Items Addressed This Visit    None  Visit Diagnoses       Subacute ethmoidal sinusitis    -  Primary    Relevant Medications    amoxicillin-pot clavulanate (Augmentin) 875-125 mg tablet    brompheniramine-pseudoeph-DM (Bromfed DM) 2-30-10 mg/5 mL syrup    Environmental and seasonal allergies        Relevant Medications    brompheniramine-pseudoeph-DM (Bromfed DM) 2-30-10 mg/5 mL syrup    Antibiotic-induced yeast infection        Relevant Medications    fluconazole (Diflucan) 150 mg tablet            UC Course  Patient disposition: Home    Red flags for reporting to ER have been reviewed with the patient.    Current diagnosis, any medication changes, and all in-office lab or radiologic results have been reviewed with the patient at the time of the visit.   If symptoms do not improve or worsen, patient is to follow up with PCP or report to the emergency room.   Patient is alert and oriented x3 and non-toxic appearing. Vital signs are stable.   Patient and/or guardian has sufficient decision-making capabilities at this time and reports understanding and agreement with the treatment plan made through shared  decision-making.

## 2024-08-12 DIAGNOSIS — G89.29 CHRONIC LOW BACK PAIN WITH BILATERAL SCIATICA, UNSPECIFIED BACK PAIN LATERALITY: ICD-10-CM

## 2024-08-12 DIAGNOSIS — M54.42 CHRONIC LOW BACK PAIN WITH BILATERAL SCIATICA, UNSPECIFIED BACK PAIN LATERALITY: ICD-10-CM

## 2024-08-12 DIAGNOSIS — M54.41 CHRONIC LOW BACK PAIN WITH BILATERAL SCIATICA, UNSPECIFIED BACK PAIN LATERALITY: ICD-10-CM

## 2024-08-13 ENCOUNTER — APPOINTMENT (OUTPATIENT)
Dept: RADIOLOGY | Facility: CLINIC | Age: 49
End: 2024-08-13
Payer: COMMERCIAL

## 2024-08-13 ENCOUNTER — APPOINTMENT (OUTPATIENT)
Dept: GENETICS | Facility: CLINIC | Age: 49
End: 2024-08-13
Payer: COMMERCIAL

## 2024-08-14 ENCOUNTER — TELEPHONE (OUTPATIENT)
Dept: PRIMARY CARE | Facility: CLINIC | Age: 49
End: 2024-08-14
Payer: COMMERCIAL

## 2024-08-14 NOTE — TELEPHONE ENCOUNTER
Pt called having concerns with her Kidneys. She stated the doctor that prescribes her Keppra will be starting to wean her off that medication. Due to family circumstances staying on the Keppra longer would be best because she wont be able to drive for a long period of time after. She would like to know does this medication have any effect on pt kidneys and can she stay on this for a longer period of time? She is also going to speak to her kidney doctor but wanted  opinion first. Please give pt a call.

## 2024-08-15 NOTE — TELEPHONE ENCOUNTER
Pt called back and said to disregard this message she talked to the doctor's and feels better about things.

## 2024-08-16 PROBLEM — B37.9 ANTIBIOTIC-INDUCED YEAST INFECTION: Status: ACTIVE | Noted: 2024-08-16

## 2024-08-16 PROBLEM — T36.95XA ANTIBIOTIC-INDUCED YEAST INFECTION: Status: ACTIVE | Noted: 2024-08-16

## 2024-08-16 PROBLEM — J30.89 ENVIRONMENTAL AND SEASONAL ALLERGIES: Status: ACTIVE | Noted: 2024-08-16

## 2024-08-16 ASSESSMENT — VISUAL ACUITY: OU: 1

## 2024-08-19 ENCOUNTER — OFFICE VISIT (OUTPATIENT)
Dept: URGENT CARE | Facility: CLINIC | Age: 49
End: 2024-08-19
Payer: COMMERCIAL

## 2024-08-19 VITALS
OXYGEN SATURATION: 98 % | WEIGHT: 205 LBS | TEMPERATURE: 98 F | SYSTOLIC BLOOD PRESSURE: 128 MMHG | DIASTOLIC BLOOD PRESSURE: 90 MMHG | BODY MASS INDEX: 30.26 KG/M2 | HEART RATE: 87 BPM

## 2024-08-19 DIAGNOSIS — H60.501 ACUTE OTITIS EXTERNA OF RIGHT EAR, UNSPECIFIED TYPE: ICD-10-CM

## 2024-08-19 DIAGNOSIS — H65.191 ACUTE EFFUSION OF RIGHT EAR: Primary | ICD-10-CM

## 2024-08-19 PROBLEM — E66.9 OBESITY: Status: ACTIVE | Noted: 2024-08-19

## 2024-08-19 PROBLEM — K31.9 DISEASE OF STOMACH AND DUODENUM, UNSPECIFIED: Status: ACTIVE | Noted: 2023-05-12

## 2024-08-19 PROBLEM — N18.31 STAGE 3A CHRONIC KIDNEY DISEASE (MULTI): Status: ACTIVE | Noted: 2024-08-19

## 2024-08-19 PROBLEM — M25.511 PAIN IN RIGHT SHOULDER: Status: ACTIVE | Noted: 2021-11-11

## 2024-08-19 PROBLEM — R09.81 CONGESTION OF PARANASAL SINUS: Status: ACTIVE | Noted: 2024-08-19

## 2024-08-19 PROBLEM — B49 INFECTION DUE TO FUNGUS: Status: ACTIVE | Noted: 2024-08-19

## 2024-08-19 PROBLEM — L02.93: Status: ACTIVE | Noted: 2024-08-19

## 2024-08-19 PROBLEM — I10 BENIGN ESSENTIAL HYPERTENSION: Status: ACTIVE | Noted: 2023-05-12

## 2024-08-19 PROBLEM — K57.30 DIVERTICULOSIS OF LARGE INTESTINE WITHOUT PERFORATION OR ABSCESS WITHOUT BLEEDING: Status: ACTIVE | Noted: 2023-05-12

## 2024-08-19 PROBLEM — M54.42 ACUTE LEFT-SIDED LOW BACK PAIN WITH LEFT-SIDED SCIATICA: Status: ACTIVE | Noted: 2023-10-17

## 2024-08-19 PROBLEM — S49.92XA UNSPECIFIED INJURY OF LEFT SHOULDER AND UPPER ARM, INITIAL ENCOUNTER: Status: RESOLVED | Noted: 2021-11-11 | Resolved: 2024-08-19

## 2024-08-19 PROBLEM — Z14.8 CARRIER OF HEMOCHROMATOSIS HFE GENE MUTATION: Status: ACTIVE | Noted: 2024-08-19

## 2024-08-19 PROBLEM — S61.213A LACERATION WITHOUT FOREIGN BODY OF LEFT MIDDLE FINGER WITHOUT DAMAGE TO NAIL, INITIAL ENCOUNTER: Status: RESOLVED | Noted: 2021-04-19 | Resolved: 2024-08-19

## 2024-08-19 PROBLEM — R31.0 GROSS HEMATURIA: Status: ACTIVE | Noted: 2024-08-19

## 2024-08-19 PROBLEM — F41.9 ANXIETY DISORDER: Status: ACTIVE | Noted: 2024-08-19

## 2024-08-19 PROBLEM — R53.83 FATIGUE: Status: ACTIVE | Noted: 2024-08-19

## 2024-08-19 PROBLEM — M25.569 KNEE PAIN: Status: ACTIVE | Noted: 2024-08-19

## 2024-08-19 PROBLEM — K30 INDIGESTION: Status: ACTIVE | Noted: 2024-08-19

## 2024-08-19 PROBLEM — L30.9 ECZEMA: Status: ACTIVE | Noted: 2023-05-12

## 2024-08-19 PROBLEM — L72.0 EPIDERMOID CYST: Status: ACTIVE | Noted: 2024-08-19

## 2024-08-19 PROBLEM — G43.909 MIGRAINE, UNSPECIFIED, NOT INTRACTABLE, WITHOUT STATUS MIGRAINOSUS: Status: ACTIVE | Noted: 2023-05-12

## 2024-08-19 PROBLEM — H10.9 CONJUNCTIVITIS: Status: RESOLVED | Noted: 2024-08-19 | Resolved: 2024-08-19

## 2024-08-19 PROBLEM — R79.89 ABNORMAL BLOOD CREATININE LEVEL: Status: ACTIVE | Noted: 2024-08-19

## 2024-08-19 PROBLEM — G40.909 EPILEPSY, UNSPECIFIED, NOT INTRACTABLE, WITHOUT STATUS EPILEPTICUS (MULTI): Status: ACTIVE | Noted: 2023-05-12

## 2024-08-19 PROBLEM — R53.1 WEAKNESS: Status: ACTIVE | Noted: 2023-10-17
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
## 2024-08-19 PROCEDURE — 3044F HG A1C LEVEL LT 7.0%: CPT

## 2024-08-19 PROCEDURE — 3074F SYST BP LT 130 MM HG: CPT

## 2024-08-19 PROCEDURE — 3048F LDL-C <100 MG/DL: CPT

## 2024-08-19 PROCEDURE — 3080F DIAST BP >= 90 MM HG: CPT

## 2024-08-19 PROCEDURE — 3062F POS MACROALBUMINURIA REV: CPT

## 2024-08-19 PROCEDURE — 99213 OFFICE O/P EST LOW 20 MIN: CPT

## 2024-08-19 RX ORDER — NEOMYCIN SULFATE, POLYMYXIN B SULFATE, HYDROCORTISONE 3.5; 10000; 1 MG/ML; [USP'U]/ML; MG/ML
3 SOLUTION/ DROPS AURICULAR (OTIC) 3 TIMES DAILY
Qty: 5 ML | Refills: 0 | Status: SHIPPED | OUTPATIENT
Start: 2024-08-19 | End: 2024-08-26

## 2024-08-19 RX ORDER — CEFDINIR 300 MG/1
300 CAPSULE ORAL 2 TIMES DAILY
Qty: 20 CAPSULE | Refills: 0 | Status: SHIPPED | OUTPATIENT
Start: 2024-08-19 | End: 2024-08-29

## 2024-08-19 RX ORDER — FLUTICASONE PROPIONATE 50 MCG
1 SPRAY, SUSPENSION (ML) NASAL DAILY
Qty: 16 G | Refills: 0 | Status: SHIPPED | OUTPATIENT
Start: 2024-08-19 | End: 2025-08-19

## 2024-08-19 ASSESSMENT — ENCOUNTER SYMPTOMS
CHILLS: 0
CARDIOVASCULAR NEGATIVE: 1
GASTROINTESTINAL NEGATIVE: 1
SORE THROAT: 0
RESPIRATORY NEGATIVE: 1
VOICE CHANGE: 0
CONSTITUTIONAL NEGATIVE: 1
DIAPHORESIS: 0
COUGH: 0
VOMITING: 0
FATIGUE: 0
HEADACHES: 1
SINUS PAIN: 0
ARTHRALGIAS: 0
SHORTNESS OF BREATH: 0
NECK PAIN: 0
PALPITATIONS: 0
LIGHT-HEADEDNESS: 0
RHINORRHEA: 1
WEAKNESS: 0
NAUSEA: 0
EYE REDNESS: 0
COLOR CHANGE: 0
WOUND: 0
MUSCULOSKELETAL NEGATIVE: 1
ABDOMINAL PAIN: 0
DIZZINESS: 1
FEVER: 0
MYALGIAS: 0
FACIAL SWELLING: 0
DIARRHEA: 0
TROUBLE SWALLOWING: 0

## 2024-08-19 NOTE — PROGRESS NOTES
Subjective   History  Mirna De La Vega is a 49 y.o. female who presents for Earache.    Patient presents with reports of right ear pain and painful chewing since yesterday. Patient was recently treated for a sinus infection.       History provided by:  Patient and medical records   used: No    Earache   There is pain in the right ear. This is a recurrent problem. The current episode started yesterday. The problem occurs constantly. The problem has been gradually worsening. There has been no fever. The pain is at a severity of 5/10. The pain is moderate. Associated symptoms include headaches and rhinorrhea. Pertinent negatives include no abdominal pain, coughing, diarrhea, ear discharge, hearing loss, neck pain, rash, sore throat or vomiting. She has tried NSAIDs for the symptoms. The treatment provided mild relief.     Past Surgical History:   Procedure Laterality Date     SECTION, LOW TRANSVERSE      HERNIA REPAIR  2018    Inguinal Hernia Repair    OTHER SURGICAL HISTORY  2016    Brain Surgery     Social History     Tobacco Use    Smoking status: Never    Smokeless tobacco: Never   Vaping Use    Vaping status: Never Used   Substance Use Topics    Alcohol use: Never    Drug use: Never       Review of Systems   Review of Systems   Constitutional: Negative.  Negative for chills, diaphoresis, fatigue and fever.   HENT:  Positive for ear pain and rhinorrhea. Negative for congestion, dental problem, ear discharge, facial swelling, hearing loss, sinus pain, sore throat, trouble swallowing and voice change.    Eyes:  Negative for redness.   Respiratory: Negative.  Negative for cough and shortness of breath.    Cardiovascular: Negative.  Negative for chest pain and palpitations.   Gastrointestinal: Negative.  Negative for abdominal pain, diarrhea, nausea and vomiting.   Musculoskeletal: Negative.  Negative for arthralgias, myalgias and neck pain.   Skin: Negative.  Negative for color  change, rash and wound.   Neurological:  Positive for dizziness and headaches. Negative for weakness and light-headedness.       Objective   Vital Signs  /90   Pulse 87   Temp 36.7 °C (98 °F)   Wt 93 kg (205 lb)   SpO2 98%   BMI 30.26 kg/m²    All vitals have been reviewed and are stable.     Physical Exam  Physical Exam  Vitals and nursing note reviewed.   Constitutional:       General: She is awake. She is not in acute distress.     Appearance: Normal appearance. She is well-developed. She is not ill-appearing, toxic-appearing or diaphoretic.   HENT:      Head: Normocephalic and atraumatic. No right periorbital erythema or left periorbital erythema.      Jaw: There is normal jaw occlusion.      Right Ear: Ear canal and external ear normal. No drainage, swelling or tenderness. A middle ear effusion is present. Tympanic membrane is injected. Tympanic membrane is not perforated, erythematous or bulging.      Left Ear: Tympanic membrane, ear canal and external ear normal. No drainage, swelling or tenderness.  No middle ear effusion. Tympanic membrane is not injected, perforated, erythematous or bulging.      Nose: Rhinorrhea present. No congestion. Rhinorrhea is clear.      Mouth/Throat:      Lips: Pink. No lesions.      Mouth: Mucous membranes are moist. No oral lesions or angioedema.      Pharynx: Oropharynx is clear. Uvula midline. Postnasal drip present. No posterior oropharyngeal erythema or uvula swelling.   Eyes:      General: Lids are normal. Vision grossly intact. Gaze aligned appropriately.      Extraocular Movements: Extraocular movements intact.      Conjunctiva/sclera: Conjunctivae normal.      Right eye: Right conjunctiva is not injected.      Left eye: Left conjunctiva is not injected.      Pupils: Pupils are equal, round, and reactive to light.   Cardiovascular:      Rate and Rhythm: Normal rate and regular rhythm.   Pulmonary:      Effort: Pulmonary effort is normal. No tachypnea or  respiratory distress.      Breath sounds: Normal air entry. No stridor. No wheezing.   Abdominal:      General: Abdomen is flat. There is no distension.      Palpations: Abdomen is soft.   Musculoskeletal:         General: No swelling or deformity. Normal range of motion.      Cervical back: Full passive range of motion without pain, normal range of motion and neck supple.   Skin:     General: Skin is warm and dry.      Findings: No erythema or rash.   Neurological:      General: No focal deficit present.      Mental Status: She is alert and oriented to person, place, and time. Mental status is at baseline.      Motor: Motor function is intact.      Coordination: Coordination is intact.   Psychiatric:         Mood and Affect: Mood and affect normal.         Speech: Speech normal.         Behavior: Behavior normal. Behavior is cooperative.         Thought Content: Thought content normal.         Judgment: Judgment normal.         Diagnostic Results   No results found for this or any previous visit (from the past 48 hour(s)).    Assessment/Plan   Procedures   N/A    Problem List Items Addressed This Visit    None  Visit Diagnoses       Acute effusion of right ear    -  Primary    Relevant Medications    cefdinir (Omnicef) 300 mg capsule    fluticasone (Flonase) 50 mcg/actuation nasal spray    neomycin-polymyxin-HC (Cortisporin) otic solution    Acute otitis externa of right ear, unspecified type        Relevant Medications    cefdinir (Omnicef) 300 mg capsule    neomycin-polymyxin-HC (Cortisporin) otic solution            UC Course  Patient disposition: Home    Red flags for reporting to ER have been reviewed with the patient.    Current diagnosis, any medication changes, and all in-office lab or radiologic results have been reviewed with the patient at the time of the visit.   If symptoms do not improve or worsen, patient is to follow up with PCP or report to the emergency room.   Patient is alert and oriented x3 and  non-toxic appearing. Vital signs are stable.   Patient and/or guardian has sufficient decision-making capabilities at this time and reports understanding and agreement with the treatment plan made through shared decision-making.

## 2024-08-21 ENCOUNTER — APPOINTMENT (OUTPATIENT)
Dept: PRIMARY CARE | Facility: CLINIC | Age: 49
End: 2024-08-21
Payer: COMMERCIAL

## 2024-08-21 ASSESSMENT — VISUAL ACUITY: OU: 1

## 2024-09-03 ENCOUNTER — APPOINTMENT (OUTPATIENT)
Dept: RADIOLOGY | Facility: CLINIC | Age: 49
End: 2024-09-03
Payer: COMMERCIAL

## 2024-09-05 ENCOUNTER — APPOINTMENT (OUTPATIENT)
Dept: RADIOLOGY | Facility: CLINIC | Age: 49
End: 2024-09-05
Payer: COMMERCIAL

## 2024-09-10 ENCOUNTER — APPOINTMENT (OUTPATIENT)
Dept: RADIOLOGY | Facility: CLINIC | Age: 49
End: 2024-09-10
Payer: COMMERCIAL

## 2024-09-24 ENCOUNTER — APPOINTMENT (OUTPATIENT)
Dept: SURGICAL ONCOLOGY | Facility: CLINIC | Age: 49
End: 2024-09-24
Payer: COMMERCIAL

## 2024-09-24 ENCOUNTER — TELEPHONE (OUTPATIENT)
Dept: SCHEDULING | Age: 49
End: 2024-09-24

## 2024-09-24 ENCOUNTER — APPOINTMENT (OUTPATIENT)
Dept: GENETICS | Facility: CLINIC | Age: 49
End: 2024-09-24
Payer: COMMERCIAL

## 2024-09-24 NOTE — TELEPHONE ENCOUNTER
Call placed to patient and informed per orders Dr. Manzano pt does not need phlebotomy in November.  Message sent to scheduling to cancel Phleb Infusion appt on 11/8/24.  Reinforced with pt to have labs drawn prior to next FUV with Dr. Manzano in February and that there is a possible phleb appt same day.  Pt verbalizes understanding and appreciative of information.  Denies further questions or needs.  Patient verbalizes understanding of plan of care via teachback method.

## 2024-09-24 NOTE — TELEPHONE ENCOUNTER
Labs reviewed and attached below.  Message forwarded to Dr. Manzano to receive orders regarding holding Phlebomy appt currently scheduled on 11/8/24.      Call placed to pt with message left on unidentified voicemail to return my call.  Awaiting response from Dr. Manzano        CBC and Auto Differential  Order: [id number]   Status: Final result       Visible to patient: Yes (seen)       Dx: Polycythemia    0 Result Notes            Component  Ref Range & Units 1 mo ago  (8/8/24) 3 mo ago  (6/14/24) 4 mo ago  (5/17/24) 5 mo ago  (4/19/24) 6 mo ago  (3/22/24) 7 mo ago  (2/23/24) 7 mo ago  (2/8/24)   WBC  4.4 - 11.3 x10*3/uL 7.6 8.2 9.5 8.9 8.4 7.7 8.8   nRBC  CM CM CM CM CM 0.0 R   Comment: Not Measured   RBC  4.00 - 5.20 x10*6/uL 5.33 High  4.90 5.05 4.77 5.16 4.98 5.10   Hemoglobin  12.0 - 16.0 g/dL 14.9 14.0 14.3 13.8 15.1 14.4 15.0   Hematocrit  36.0 - 46.0 % 48.2 High  44.0 46.7 High  44.5 48.0 High  45.7 47.7 High    MCV  80 - 100 fL 90 90 93 93 93 92 94   MCH  26.0 - 34.0 pg 28.0 28.6 28.3 28.9 29.3 28.9 29.4   MCHC  32.0 - 36.0 g/dL 30.9 Low  31.8 Low  30.6 Low  31.0 Low  31.5 Low  31.5 Low  31.4 Low    RDW  11.5 - 14.5 % 14.6 High  13.9 13.5 13.7 14.1 14.2 14.3   Platelets  150 - 450 x10*3/uL 278 265 298 303 282 312 282   Neutrophils %  40.0 - 80.0 % 80.4 82.6 81.4 72.3 75.6 76.9 77.6   Immature Granulocytes %, Automated  0.0 - 0.9 % 0.3 0.2 CM 0.2 CM 0.3 CM 0.4 CM 0.4 CM 0.5 CM   Comment: Immature Granulocyte Count (IG) includes promyelocytes, myelocytes and metamyelocytes but does not include bands. Percent differential counts (%) should be interpreted in the context of the absolute cell counts (cells/UL).   Lymphocytes %  13.0 - 44.0 % 10.3 11.3 9.6 12.3 13.2 13.4 12.6   Monocytes %  2.0 - 10.0 % 7.4 4.7 5.3 5.7 5.5 6.3 8.0   Eosinophils %  0.0 - 6.0 % 1.2 1.0 3.2 8.9 4.9 2.6 0.7   Basophils %  0.0 - 2.0 % 0.4 0.2 0.3 0.5 0.4 0.4 0.6   Neutrophils Absolute  1.20 - 7.70 x10*3/uL 6.08 6.79 CM 7.69 CM 6.41 CM 6.32  CM 5.89 CM 6.81 CM   Comment: Percent differential counts (%) should be interpreted in the context of the absolute cell counts (cells/uL).   Immature Granulocytes Absolute, Automated  0.00 - 0.70 x10*3/uL 0.02 0.02 0.02 0.03 0.03 0.03 0.04   Lymphocytes Absolute  1.20 - 4.80 x10*3/uL 0.78 Low  0.93 Low  0.91 Low  1.09 Low  1.10 Low  1.03 Low  1.10 Low    Monocytes Absolute  0.10 - 1.00 x10*3/uL 0.56 0.39 0.50 0.51 0.46 0.48 0.70   Eosinophils Absolute  0.00 - 0.70 x10*3/uL 0.09 0.08 0.30 0.79 High  0.41 0.20 0.06   Basophils Absolute  0.00 - 0.10 x10*3/uL 0.03 0.02 0.03 0.04 0.03 0.03 0.05   Resulting Agency [facility]          Ferritin  Order: [id number]   Status: Final result       Visible to patient: Yes (seen)       Dx: Polycythemia    0 Result Notes            Component  Ref Range & Units 1 mo ago 3 mo ago 4 mo ago 5 mo ago 6 mo ago 7 mo ago 1 yr ago   Ferritin  8 - 150 ng/mL 47 62 48 40 84 107 112           Iron and TIBC  Order: [id number]   Status: Final result       Visible to patient: Yes (seen)       Dx: Polycythemia    0 Result Notes            Component  Ref Range & Units 1 mo ago 3 mo ago 4 mo ago 5 mo ago 6 mo ago 7 mo ago 1 yr ago   Iron  35 - 150 ug/dL 58 67 44 44 61 56 63   UIBC  110 - 370 ug/dL 289 251 289 275 251 245    TIBC  240 - 445 ug/dL 347 318 333 319 312 301 311   % Saturation  25 - 45 % 17 Low  21 Low  13 Low  14 Low  20 Low  19 Low  20 Low

## 2024-09-24 NOTE — TELEPHONE ENCOUNTER
Pt called and has questions about her 11/8 phleb infusion, she would like a call back to discuss if this is still needed or not please    Thank you

## 2024-09-25 DIAGNOSIS — H65.191 ACUTE EFFUSION OF RIGHT EAR: ICD-10-CM

## 2024-09-26 RX ORDER — FLUTICASONE PROPIONATE 50 MCG
1 SPRAY, SUSPENSION (ML) NASAL DAILY
Qty: 16 ML | OUTPATIENT
Start: 2024-09-26 | End: 2025-09-26

## 2024-09-26 NOTE — TELEPHONE ENCOUNTER
Urgent Care patient with short term Rx - prolonged treatment is inappropriate. Reevaluation required for any additional treatment.

## 2024-10-01 ENCOUNTER — APPOINTMENT (OUTPATIENT)
Dept: DERMATOLOGY | Facility: CLINIC | Age: 49
End: 2024-10-01
Payer: COMMERCIAL

## 2024-10-05 ENCOUNTER — LAB (OUTPATIENT)
Dept: LAB | Facility: LAB | Age: 49
End: 2024-10-05
Payer: COMMERCIAL

## 2024-10-05 DIAGNOSIS — N18.31 STAGE 3A CHRONIC KIDNEY DISEASE (MULTI): ICD-10-CM

## 2024-10-05 PROCEDURE — 82043 UR ALBUMIN QUANTITATIVE: CPT

## 2024-10-05 PROCEDURE — 80069 RENAL FUNCTION PANEL: CPT

## 2024-10-05 PROCEDURE — 36415 COLL VENOUS BLD VENIPUNCTURE: CPT

## 2024-10-05 PROCEDURE — 81003 URINALYSIS AUTO W/O SCOPE: CPT

## 2024-10-05 PROCEDURE — 84156 ASSAY OF PROTEIN URINE: CPT

## 2024-10-05 PROCEDURE — 85027 COMPLETE CBC AUTOMATED: CPT

## 2024-10-05 PROCEDURE — 82570 ASSAY OF URINE CREATININE: CPT

## 2024-10-06 LAB
ALBUMIN SERPL BCP-MCNC: 4.1 G/DL (ref 3.4–5)
ANION GAP SERPL CALC-SCNC: 12 MMOL/L (ref 10–20)
APPEARANCE UR: CLEAR
BILIRUB UR STRIP.AUTO-MCNC: NEGATIVE MG/DL
BUN SERPL-MCNC: 15 MG/DL (ref 6–23)
CALCIUM SERPL-MCNC: 10 MG/DL (ref 8.6–10.6)
CHLORIDE SERPL-SCNC: 101 MMOL/L (ref 98–107)
CO2 SERPL-SCNC: 30 MMOL/L (ref 21–32)
COLOR UR: COLORLESS
CREAT SERPL-MCNC: 1.15 MG/DL (ref 0.5–1.05)
CREAT UR-MCNC: 61.8 MG/DL (ref 20–320)
CREAT UR-MCNC: 61.8 MG/DL (ref 20–320)
EGFRCR SERPLBLD CKD-EPI 2021: 59 ML/MIN/1.73M*2
ERYTHROCYTE [DISTWIDTH] IN BLOOD BY AUTOMATED COUNT: 14 % (ref 11.5–14.5)
GLUCOSE SERPL-MCNC: 78 MG/DL (ref 74–99)
GLUCOSE UR STRIP.AUTO-MCNC: NORMAL MG/DL
HCT VFR BLD AUTO: 45.7 % (ref 36–46)
HGB BLD-MCNC: 14.1 G/DL (ref 12–16)
KETONES UR STRIP.AUTO-MCNC: NEGATIVE MG/DL
LEUKOCYTE ESTERASE UR QL STRIP.AUTO: NEGATIVE
MCH RBC QN AUTO: 28.1 PG (ref 26–34)
MCHC RBC AUTO-ENTMCNC: 30.9 G/DL (ref 32–36)
MCV RBC AUTO: 91 FL (ref 80–100)
MICROALBUMIN UR-MCNC: <7 MG/L
MICROALBUMIN/CREAT UR: NORMAL MG/G{CREAT}
NITRITE UR QL STRIP.AUTO: NEGATIVE
NRBC BLD-RTO: 0 /100 WBCS (ref 0–0)
PH UR STRIP.AUTO: 6.5 [PH]
PHOSPHATE SERPL-MCNC: 3.2 MG/DL (ref 2.5–4.9)
PLATELET # BLD AUTO: 339 X10*3/UL (ref 150–450)
POTASSIUM SERPL-SCNC: 4.3 MMOL/L (ref 3.5–5.3)
PROT UR STRIP.AUTO-MCNC: NEGATIVE MG/DL
PROT UR-ACNC: 5 MG/DL (ref 5–24)
PROT/CREAT UR: 0.08 MG/MG CREAT (ref 0–0.17)
RBC # BLD AUTO: 5.01 X10*6/UL (ref 4–5.2)
RBC # UR STRIP.AUTO: NEGATIVE /UL
SODIUM SERPL-SCNC: 139 MMOL/L (ref 136–145)
SP GR UR STRIP.AUTO: 1.01
UROBILINOGEN UR STRIP.AUTO-MCNC: NORMAL MG/DL
WBC # BLD AUTO: 7.9 X10*3/UL (ref 4.4–11.3)

## 2024-10-11 ENCOUNTER — APPOINTMENT (OUTPATIENT)
Dept: NEPHROLOGY | Facility: CLINIC | Age: 49
End: 2024-10-11
Payer: COMMERCIAL

## 2024-10-11 VITALS
WEIGHT: 202.6 LBS | DIASTOLIC BLOOD PRESSURE: 81 MMHG | HEART RATE: 89 BPM | BODY MASS INDEX: 30.01 KG/M2 | SYSTOLIC BLOOD PRESSURE: 137 MMHG | RESPIRATION RATE: 16 BRPM | HEIGHT: 69 IN | OXYGEN SATURATION: 99 %

## 2024-10-11 DIAGNOSIS — I12.9 HYPERTENSIVE CHRONIC KIDNEY DISEASE WITH STAGE 1 THROUGH STAGE 4 CHRONIC KIDNEY DISEASE, OR UNSPECIFIED CHRONIC KIDNEY DISEASE: Primary | ICD-10-CM

## 2024-10-11 PROCEDURE — 3062F POS MACROALBUMINURIA REV: CPT | Performed by: INTERNAL MEDICINE

## 2024-10-11 PROCEDURE — 3079F DIAST BP 80-89 MM HG: CPT | Performed by: INTERNAL MEDICINE

## 2024-10-11 PROCEDURE — 3048F LDL-C <100 MG/DL: CPT | Performed by: INTERNAL MEDICINE

## 2024-10-11 PROCEDURE — 3075F SYST BP GE 130 - 139MM HG: CPT | Performed by: INTERNAL MEDICINE

## 2024-10-11 PROCEDURE — 3044F HG A1C LEVEL LT 7.0%: CPT | Performed by: INTERNAL MEDICINE

## 2024-10-11 PROCEDURE — 3008F BODY MASS INDEX DOCD: CPT | Performed by: INTERNAL MEDICINE

## 2024-10-11 PROCEDURE — 99213 OFFICE O/P EST LOW 20 MIN: CPT | Performed by: INTERNAL MEDICINE

## 2024-10-11 RX ORDER — NEBULIZER AND COMPRESSOR
1 EACH MISCELLANEOUS DAILY
Qty: 1 EACH | Refills: 0 | Status: SHIPPED | OUTPATIENT
Start: 2024-10-11 | End: 2025-10-11

## 2024-10-11 NOTE — PROGRESS NOTES
Chief Complaint: Follow up CKD    No specific complaints  Denies nausea, vomiting, chest pain, dyspnea  No urinary symptoms    NAD  Sclera AI s inj  MMM, no sores  Deferred secondary to COVID  No edema  No tremor  No rash    CKD stage 3a  HTN generally well controlled   Proteinuria, none significant  ADPKD via genetic testing     Genetics referral  Labs and follow up in 6 months

## 2024-10-22 ENCOUNTER — APPOINTMENT (OUTPATIENT)
Dept: OBSTETRICS AND GYNECOLOGY | Facility: CLINIC | Age: 49
End: 2024-10-22
Payer: COMMERCIAL

## 2024-10-22 ENCOUNTER — APPOINTMENT (OUTPATIENT)
Dept: SURGICAL ONCOLOGY | Facility: CLINIC | Age: 49
End: 2024-10-22
Payer: COMMERCIAL

## 2024-10-23 ENCOUNTER — APPOINTMENT (OUTPATIENT)
Dept: SURGICAL ONCOLOGY | Facility: CLINIC | Age: 49
End: 2024-10-23
Payer: COMMERCIAL

## 2024-10-28 ENCOUNTER — APPOINTMENT (OUTPATIENT)
Dept: DERMATOLOGY | Facility: CLINIC | Age: 49
End: 2024-10-28
Payer: COMMERCIAL

## 2024-11-02 PROBLEM — E11.9 TYPE 2 DIABETES MELLITUS WITHOUT COMPLICATION, WITHOUT LONG-TERM CURRENT USE OF INSULIN (MULTI): Status: RESOLVED | Noted: 2024-01-22 | Resolved: 2024-11-02

## 2024-11-02 PROBLEM — R73.09 ELEVATED GLUCOSE: Status: ACTIVE | Noted: 2024-11-02

## 2024-11-04 ENCOUNTER — APPOINTMENT (OUTPATIENT)
Dept: DERMATOLOGY | Facility: CLINIC | Age: 49
End: 2024-11-04
Payer: COMMERCIAL

## 2024-11-07 ENCOUNTER — APPOINTMENT (OUTPATIENT)
Dept: PRIMARY CARE | Facility: CLINIC | Age: 49
End: 2024-11-07
Payer: COMMERCIAL

## 2024-11-07 VITALS
DIASTOLIC BLOOD PRESSURE: 78 MMHG | SYSTOLIC BLOOD PRESSURE: 114 MMHG | BODY MASS INDEX: 29.95 KG/M2 | WEIGHT: 202.8 LBS | TEMPERATURE: 97.9 F

## 2024-11-07 DIAGNOSIS — R73.09 ELEVATED GLUCOSE: Primary | ICD-10-CM

## 2024-11-07 DIAGNOSIS — N18.31 STAGE 3A CHRONIC KIDNEY DISEASE (MULTI): ICD-10-CM

## 2024-11-07 DIAGNOSIS — I10 BENIGN ESSENTIAL HYPERTENSION: ICD-10-CM

## 2024-11-07 DIAGNOSIS — Z14.8 HEMOCHROMATOSIS CARRIER: ICD-10-CM

## 2024-11-07 PROCEDURE — 3078F DIAST BP <80 MM HG: CPT | Performed by: INTERNAL MEDICINE

## 2024-11-07 PROCEDURE — 1036F TOBACCO NON-USER: CPT | Performed by: INTERNAL MEDICINE

## 2024-11-07 PROCEDURE — 3074F SYST BP LT 130 MM HG: CPT | Performed by: INTERNAL MEDICINE

## 2024-11-07 PROCEDURE — 99214 OFFICE O/P EST MOD 30 MIN: CPT | Performed by: INTERNAL MEDICINE

## 2024-11-07 ASSESSMENT — ENCOUNTER SYMPTOMS
DEPRESSION: 0
OCCASIONAL FEELINGS OF UNSTEADINESS: 0
LOSS OF SENSATION IN FEET: 0

## 2024-11-07 NOTE — PROGRESS NOTES
Subjective   Patient ID: Mirna De La Vega is a 49 y.o. female who presents for Diabetes.    HPI     Review of Systems    Objective   /78 (BP Location: Left arm, Patient Position: Sitting, BP Cuff Size: Large adult)   Temp 36.6 °C (97.9 °F) (Skin)   Wt 92 kg (202 lb 12.8 oz)   BMI 29.95 kg/m²     Physical Exam        Lab Results   Component Value Date    WBC 7.9 10/05/2024    HGB 14.1 10/05/2024    HCT 45.7 10/05/2024     10/05/2024    CHOL 166 01/17/2024    TRIG 63 01/17/2024    HDL 57.5 01/17/2024    ALT 18 08/08/2024    AST 19 08/08/2024     10/05/2024    K 4.3 10/05/2024     10/05/2024    CREATININE 1.15 (H) 10/05/2024    BUN 15 10/05/2024    CO2 30 10/05/2024    TSH 1.06 07/27/2023    HGBA1C 6.6 (H) 08/01/2024       Assessment/Plan          #1 elevated Cr/ckd-  (Genetics +PKD)reviewed.  Stable.  Reviewed no NSAIDs.  Blood pressure controlled.  Sugar control.   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 retest iron next visit  #4 fhx DVT- father. s/p heme eval  #5 fatty liver- reviewed. diet/exercise. no EtOH   #6 dyspepsia- f/u  GI  #7 breast-   #8 h/o ut-ptagag-lg neurology  #9 light-chain- f/u  heme.   #10 breast-follow-up gynecology.  Patient will call and discuss.  #11 diabetes-reviewed at length.  Discussed prognosis.  Discussed treatment options.  Recommend beginning GLP-1 medicine.  Reviewed indications.  Would also consider using SGLT given kidney issues.  She will consider but at this point declines medicines.  Wishes to focus on diet and exercise.  Consult nutrition.  Appointment pending with ophthalmology.  Follow-up 3 to 4 months.  Likely add statin next visit.

## 2024-11-08 ENCOUNTER — APPOINTMENT (OUTPATIENT)
Dept: HEMATOLOGY/ONCOLOGY | Facility: CLINIC | Age: 49
End: 2024-11-08
Payer: COMMERCIAL

## 2024-11-12 ENCOUNTER — TELEMEDICINE CLINICAL SUPPORT (OUTPATIENT)
Dept: NUTRITION | Facility: HOSPITAL | Age: 49
End: 2024-11-12
Payer: COMMERCIAL

## 2024-11-12 VITALS — BODY MASS INDEX: 29.93 KG/M2 | HEIGHT: 69 IN

## 2024-11-12 DIAGNOSIS — R73.09 ELEVATED GLUCOSE: ICD-10-CM

## 2024-11-12 DIAGNOSIS — E11.9 TYPE 2 DIABETES MELLITUS WITHOUT COMPLICATION, WITHOUT LONG-TERM CURRENT USE OF INSULIN (MULTI): Primary | ICD-10-CM

## 2024-11-12 PROCEDURE — 97802 MEDICAL NUTRITION INDIV IN: CPT | Mod: 95
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
## 2024-11-14 ENCOUNTER — LAB (OUTPATIENT)
Dept: LAB | Facility: LAB | Age: 49
End: 2024-11-14
Payer: COMMERCIAL

## 2024-11-14 DIAGNOSIS — R73.09 ELEVATED GLUCOSE: ICD-10-CM

## 2024-11-14 LAB
ALT SERPL W P-5'-P-CCNC: 17 U/L (ref 7–45)
ANION GAP SERPL CALC-SCNC: 12 MMOL/L (ref 10–20)
BUN SERPL-MCNC: 17 MG/DL (ref 6–23)
CALCIUM SERPL-MCNC: 9.5 MG/DL (ref 8.6–10.3)
CHLORIDE SERPL-SCNC: 103 MMOL/L (ref 98–107)
CHOLEST SERPL-MCNC: 155 MG/DL (ref 0–199)
CHOLESTEROL/HDL RATIO: 3.3
CO2 SERPL-SCNC: 29 MMOL/L (ref 21–32)
CREAT SERPL-MCNC: 1.21 MG/DL (ref 0.5–1.05)
EGFRCR SERPLBLD CKD-EPI 2021: 56 ML/MIN/1.73M*2
EST. AVERAGE GLUCOSE BLD GHB EST-MCNC: 137 MG/DL
GLUCOSE SERPL-MCNC: 72 MG/DL (ref 74–99)
HBA1C MFR BLD: 6.4 %
HDLC SERPL-MCNC: 47.5 MG/DL
LDLC SERPL CALC-MCNC: 94 MG/DL
NON HDL CHOLESTEROL: 108 MG/DL (ref 0–149)
POTASSIUM SERPL-SCNC: 4.2 MMOL/L (ref 3.5–5.3)
SODIUM SERPL-SCNC: 140 MMOL/L (ref 136–145)
TRIGL SERPL-MCNC: 68 MG/DL (ref 0–149)
VLDL: 14 MG/DL (ref 0–40)

## 2024-11-14 PROCEDURE — 36415 COLL VENOUS BLD VENIPUNCTURE: CPT

## 2024-11-14 PROCEDURE — 83036 HEMOGLOBIN GLYCOSYLATED A1C: CPT

## 2024-11-14 PROCEDURE — 80061 LIPID PANEL: CPT

## 2024-11-14 PROCEDURE — 84460 ALANINE AMINO (ALT) (SGPT): CPT

## 2024-11-14 PROCEDURE — 80048 BASIC METABOLIC PNL TOTAL CA: CPT

## 2024-11-16 DIAGNOSIS — E11.9 TYPE 2 DIABETES MELLITUS WITHOUT COMPLICATION, WITHOUT LONG-TERM CURRENT USE OF INSULIN (MULTI): Primary | ICD-10-CM

## 2024-11-16 RX ORDER — TIRZEPATIDE 2.5 MG/.5ML
2.5 INJECTION, SOLUTION SUBCUTANEOUS WEEKLY
Qty: 4 PEN | Refills: 1 | Status: SHIPPED | OUTPATIENT
Start: 2024-11-16 | End: 2025-01-15

## 2024-11-26 ENCOUNTER — APPOINTMENT (OUTPATIENT)
Dept: DERMATOLOGY | Facility: CLINIC | Age: 49
End: 2024-11-26
Payer: COMMERCIAL

## 2024-12-03 ENCOUNTER — APPOINTMENT (OUTPATIENT)
Dept: DERMATOLOGY | Facility: CLINIC | Age: 49
End: 2024-12-03
Payer: COMMERCIAL

## 2025-01-02 DIAGNOSIS — E11.9 TYPE 2 DIABETES MELLITUS WITHOUT COMPLICATION, WITHOUT LONG-TERM CURRENT USE OF INSULIN (MULTI): ICD-10-CM

## 2025-01-03 RX ORDER — TIRZEPATIDE 2.5 MG/.5ML
2.5 INJECTION, SOLUTION SUBCUTANEOUS
Qty: 4 PEN | Refills: 3 | Status: SHIPPED | OUTPATIENT
Start: 2025-01-05

## 2025-01-20 ENCOUNTER — APPOINTMENT (OUTPATIENT)
Dept: PRIMARY CARE | Facility: CLINIC | Age: 50
End: 2025-01-20
Payer: COMMERCIAL

## 2025-01-20 ENCOUNTER — APPOINTMENT (OUTPATIENT)
Dept: OBSTETRICS AND GYNECOLOGY | Facility: CLINIC | Age: 50
End: 2025-01-20
Payer: COMMERCIAL

## 2025-01-20 VITALS — WEIGHT: 191 LBS | DIASTOLIC BLOOD PRESSURE: 76 MMHG | SYSTOLIC BLOOD PRESSURE: 128 MMHG | BODY MASS INDEX: 28.19 KG/M2

## 2025-01-20 DIAGNOSIS — Z12.31 SCREENING MAMMOGRAM, ENCOUNTER FOR: ICD-10-CM

## 2025-01-20 DIAGNOSIS — E11.9 TYPE 2 DIABETES MELLITUS WITHOUT COMPLICATION, WITHOUT LONG-TERM CURRENT USE OF INSULIN (MULTI): ICD-10-CM

## 2025-01-20 DIAGNOSIS — Z01.419 ENCOUNTER FOR ANNUAL ROUTINE GYNECOLOGICAL EXAMINATION: Primary | ICD-10-CM

## 2025-01-20 PROCEDURE — 1036F TOBACCO NON-USER: CPT | Performed by: OBSTETRICS & GYNECOLOGY

## 2025-01-20 PROCEDURE — 3074F SYST BP LT 130 MM HG: CPT | Performed by: OBSTETRICS & GYNECOLOGY

## 2025-01-20 PROCEDURE — 3078F DIAST BP <80 MM HG: CPT | Performed by: OBSTETRICS & GYNECOLOGY

## 2025-01-20 PROCEDURE — 99396 PREV VISIT EST AGE 40-64: CPT | Performed by: OBSTETRICS & GYNECOLOGY

## 2025-01-20 ASSESSMENT — PATIENT HEALTH QUESTIONNAIRE - PHQ9
SUM OF ALL RESPONSES TO PHQ9 QUESTIONS 1 AND 2: 0
1. LITTLE INTEREST OR PLEASURE IN DOING THINGS: NOT AT ALL
2. FEELING DOWN, DEPRESSED OR HOPELESS: NOT AT ALL

## 2025-01-20 ASSESSMENT — ENCOUNTER SYMPTOMS
OCCASIONAL FEELINGS OF UNSTEADINESS: 0
LOSS OF SENSATION IN FEET: 0
DEPRESSION: 0

## 2025-01-20 NOTE — PROGRESS NOTES
Subjective   Patient ID: Mirna De La Vega is a 49 y.o. female who presents for Gynecologic Exam (No concerns at this time, still having periods but has always been irregular, having hot flashes for about a year, noticing more irritability also. Started on Mounjaro in November. ).  HPI 49-year-old G1, P1 perimenopausal with some hot flashes and night sweats and irregular cycles who says that she is handling well is not as bad as before.  She is on Ozempic secondary to diagnosis of type 2 diabetes and has slowly been losing weight.  She denies any GYN concerns.  Her only concern is her weight in the lower abdomen which is not going away.    Review of Systems   All other systems reviewed and are negative.      Objective   Physical Exam  Vitals reviewed.   Constitutional:       Appearance: Normal appearance.   HENT:      Head: Normocephalic and atraumatic.      Nose: Nose normal.   Cardiovascular:      Rate and Rhythm: Normal rate and regular rhythm.   Pulmonary:      Effort: Pulmonary effort is normal.      Breath sounds: Normal breath sounds.   Chest:      Chest wall: No mass.   Breasts:     Right: Normal.      Left: Normal.   Abdominal:      General: Abdomen is flat. Bowel sounds are normal. There is no distension.      Palpations: Abdomen is soft. There is no mass.   Genitourinary:     General: Normal vulva.      Vagina: Normal.      Cervix: Normal.      Uterus: Normal.       Adnexa: Right adnexa normal and left adnexa normal.      Rectum: Normal.   Musculoskeletal:         General: Normal range of motion.      Cervical back: Normal range of motion.   Skin:     General: Skin is warm and dry.   Neurological:      General: No focal deficit present.      Mental Status: She is alert.   Psychiatric:         Mood and Affect: Mood normal.         Behavior: Behavior normal.         Assessment/Plan   Problem List Items Addressed This Visit             ICD-10-CM    Type 2 diabetes mellitus without complication, without  long-term current use of insulin (Multi) E11.9     Other Visit Diagnoses         Codes    Encounter for annual routine gynecological examination    -  Primary Z01.419    Screening mammogram, encounter for     Z12.31        Pap smear every 3 to 5 years.  Mammogram was ordered and she was encouraged to self breast exam monthly.  She has had screening colonoscopy done 2 years ago with negative results.  We did discuss some possible exercises or treatment for lower abdomen fat loss, as she does not want to do any surgeries says has trouble with keloid and scar formation.  Follow-up as needed or in 1 year.         Archie Harris MD 01/20/25 9:40 AM

## 2025-02-06 ENCOUNTER — APPOINTMENT (OUTPATIENT)
Dept: HEMATOLOGY/ONCOLOGY | Facility: CLINIC | Age: 50
End: 2025-02-06
Payer: COMMERCIAL

## 2025-02-07 ENCOUNTER — APPOINTMENT (OUTPATIENT)
Dept: HEMATOLOGY/ONCOLOGY | Facility: CLINIC | Age: 50
End: 2025-02-07
Payer: COMMERCIAL

## 2025-02-13 ASSESSMENT — PROMIS GLOBAL HEALTH SCALE
RATE_PHYSICAL_HEALTH: VERY GOOD
CARRYOUT_PHYSICAL_ACTIVITIES: MOSTLY
RATE_SOCIAL_SATISFACTION: VERY GOOD
RATE_AVERAGE_PAIN: 2
RATE_MENTAL_HEALTH: VERY GOOD
EMOTIONAL_PROBLEMS: SOMETIMES
RATE_AVERAGE_FATIGUE: MILD
RATE_QUALITY_OF_LIFE: VERY GOOD
RATE_GENERAL_HEALTH: VERY GOOD
CARRYOUT_SOCIAL_ACTIVITIES: VERY GOOD

## 2025-02-14 ENCOUNTER — APPOINTMENT (OUTPATIENT)
Dept: HEMATOLOGY/ONCOLOGY | Facility: CLINIC | Age: 50
End: 2025-02-14
Payer: COMMERCIAL

## 2025-02-19 ENCOUNTER — APPOINTMENT (OUTPATIENT)
Dept: PRIMARY CARE | Facility: CLINIC | Age: 50
End: 2025-02-19
Payer: COMMERCIAL

## 2025-02-19 VITALS
WEIGHT: 187.2 LBS | HEIGHT: 69 IN | BODY MASS INDEX: 27.73 KG/M2 | DIASTOLIC BLOOD PRESSURE: 78 MMHG | SYSTOLIC BLOOD PRESSURE: 116 MMHG

## 2025-02-19 DIAGNOSIS — R56.9 SEIZURES (MULTI): ICD-10-CM

## 2025-02-19 DIAGNOSIS — Z00.00 REGULAR CHECK-UP: ICD-10-CM

## 2025-02-19 DIAGNOSIS — E11.9 TYPE 2 DIABETES MELLITUS WITHOUT COMPLICATION, WITHOUT LONG-TERM CURRENT USE OF INSULIN (MULTI): Primary | ICD-10-CM

## 2025-02-19 DIAGNOSIS — D32.9 BENIGN MENINGIOMA (MULTI): ICD-10-CM

## 2025-02-19 PROCEDURE — 99396 PREV VISIT EST AGE 40-64: CPT | Performed by: INTERNAL MEDICINE

## 2025-02-19 PROCEDURE — 3074F SYST BP LT 130 MM HG: CPT | Performed by: INTERNAL MEDICINE

## 2025-02-19 PROCEDURE — 3008F BODY MASS INDEX DOCD: CPT | Performed by: INTERNAL MEDICINE

## 2025-02-19 PROCEDURE — 1036F TOBACCO NON-USER: CPT | Performed by: INTERNAL MEDICINE

## 2025-02-19 PROCEDURE — 3078F DIAST BP <80 MM HG: CPT | Performed by: INTERNAL MEDICINE

## 2025-02-19 RX ORDER — ATORVASTATIN CALCIUM 10 MG/1
10 TABLET, FILM COATED ORAL DAILY
Qty: 100 TABLET | Refills: 3 | Status: SHIPPED | OUTPATIENT
Start: 2025-02-19 | End: 2026-03-26

## 2025-02-19 RX ORDER — TIRZEPATIDE 5 MG/.5ML
5 INJECTION, SOLUTION SUBCUTANEOUS WEEKLY
Qty: 2 ML | Refills: 2 | Status: SHIPPED | OUTPATIENT
Start: 2025-02-19 | End: 2026-02-19

## 2025-02-19 NOTE — ASSESSMENT & PLAN NOTE
Orders:    atorvastatin (Lipitor) 10 mg tablet; Take 1 tablet (10 mg) by mouth once daily.    tirzepatide (Mounjaro) 5 mg/0.5 mL pen injector; Inject 5 mg under the skin 1 (one) time per week.    Hemoglobin A1C; Future    Basic Metabolic Panel; Future    Alanine Aminotransferase; Future    CBC; Future

## 2025-02-19 NOTE — PROGRESS NOTES
"Subjective   Patient ID: Mirna De La Vega is a 49 y.o. female who presents for Annual Exam.    HPI     +exercise daily   On mounjaro x 3 mths- no ADRs  Sleeping better  Review of Systems    Objective   /78   Ht 1.753 m (5' 9\")   Wt 84.9 kg (187 lb 3.2 oz)   LMP  (LMP Unknown)   BMI 27.64 kg/m²     Physical Exam  No acute distress.  Well appearing.  Alert and oriented ×3.  Oropharynx/nasopharynx clear.  Neck supple without cervical adenopathy.  Lungs clear to auscultation bilaterally.  Heart regular without murmurs, rubs, gallops.  Abdomen soft with normal active bowel sounds.  No masses or hepatosplenomegaly appreciated.  Extremities without cyanosis, clubbing, or edema  Lab Results   Component Value Date    WBC 7.9 10/05/2024    HGB 14.1 10/05/2024    HCT 45.7 10/05/2024     10/05/2024    CHOL 155 11/14/2024    TRIG 68 11/14/2024    HDL 47.5 11/14/2024    ALT 17 11/14/2024    AST 19 08/08/2024     11/14/2024    K 4.2 11/14/2024     11/14/2024    CREATININE 1.21 (H) 11/14/2024    BUN 17 11/14/2024    CO2 29 11/14/2024    TSH 1.06 07/27/2023    HGBA1C 6.4 (H) 11/14/2024       Assessment/Plan   Assessment & Plan  Type 2 diabetes mellitus without complication, without long-term current use of insulin (Multi)    Orders:  •  atorvastatin (Lipitor) 10 mg tablet; Take 1 tablet (10 mg) by mouth once daily.  •  tirzepatide (Mounjaro) 5 mg/0.5 mL pen injector; Inject 5 mg under the skin 1 (one) time per week.  •  Hemoglobin A1C; Future  •  Basic Metabolic Panel; Future  •  Alanine Aminotransferase; Future  •  CBC; Future    Regular check-up                 #1 elevated Cr/ckd-  (Genetics +PKD)reviewed.  Stable.  Reviewed no NSAIDs.  Blood pressure controlled.  Sugar control.   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 retest iron next visit  #4 fhx DVT- father. s/p heme eval  #5 fatty liver- reviewed. diet/exercise. no EtOH   #6 dyspepsia- f/u  GI  #7 " breast-   #8 h/o ag-alsaiu-qo neurology  #9 light-chain- f/u  heme.   #10 breast-follow-up gynecology.  Patient will call and discuss.  #11 diabetes-reviewed at length.  Discussed prognosis.  Discussed treatment options.   Increase Mounjaro to 5 mg.     Ophthalmology  appt 12/24--> no BDR.  Follow-up 3 to 4 months.  Will add low-dose statin. Reviewed.     Rec COVID booster

## 2025-02-20 ENCOUNTER — LAB (OUTPATIENT)
Dept: LAB | Facility: CLINIC | Age: 50
End: 2025-02-20
Payer: COMMERCIAL

## 2025-02-20 ENCOUNTER — OFFICE VISIT (OUTPATIENT)
Dept: HEMATOLOGY/ONCOLOGY | Facility: CLINIC | Age: 50
End: 2025-02-20
Payer: COMMERCIAL

## 2025-02-20 ENCOUNTER — INFUSION (OUTPATIENT)
Dept: HEMATOLOGY/ONCOLOGY | Facility: CLINIC | Age: 50
End: 2025-02-20
Payer: COMMERCIAL

## 2025-02-20 VITALS
HEIGHT: 68 IN | WEIGHT: 186.29 LBS | DIASTOLIC BLOOD PRESSURE: 74 MMHG | RESPIRATION RATE: 16 BRPM | HEART RATE: 83 BPM | TEMPERATURE: 97.7 F | SYSTOLIC BLOOD PRESSURE: 120 MMHG | OXYGEN SATURATION: 100 % | BODY MASS INDEX: 28.23 KG/M2

## 2025-02-20 DIAGNOSIS — R71.8 ELEVATED HEMATOCRIT: ICD-10-CM

## 2025-02-20 DIAGNOSIS — D75.1 POLYCYTHEMIA: ICD-10-CM

## 2025-02-20 DIAGNOSIS — Z14.8 HEMOCHROMATOSIS CARRIER: ICD-10-CM

## 2025-02-20 LAB
BASOPHILS # BLD AUTO: 0.02 X10*3/UL (ref 0–0.1)
BASOPHILS NFR BLD AUTO: 0.2 %
EOSINOPHIL # BLD AUTO: 0.02 X10*3/UL (ref 0–0.7)
EOSINOPHIL NFR BLD AUTO: 0.2 %
ERYTHROCYTE [DISTWIDTH] IN BLOOD BY AUTOMATED COUNT: 14 % (ref 11.5–14.5)
HCT VFR BLD AUTO: 47.4 % (ref 36–46)
HGB BLD-MCNC: 14.9 G/DL (ref 12–16)
IMM GRANULOCYTES # BLD AUTO: 0.02 X10*3/UL (ref 0–0.7)
IMM GRANULOCYTES NFR BLD AUTO: 0.2 % (ref 0–0.9)
LYMPHOCYTES # BLD AUTO: 1.19 X10*3/UL (ref 1.2–4.8)
LYMPHOCYTES NFR BLD AUTO: 14.1 %
MCH RBC QN AUTO: 28.9 PG (ref 26–34)
MCHC RBC AUTO-ENTMCNC: 31.4 G/DL (ref 32–36)
MCV RBC AUTO: 92 FL (ref 80–100)
MONOCYTES # BLD AUTO: 0.69 X10*3/UL (ref 0.1–1)
MONOCYTES NFR BLD AUTO: 8.2 %
NEUTROPHILS # BLD AUTO: 6.5 X10*3/UL (ref 1.2–7.7)
NEUTROPHILS NFR BLD AUTO: 77.1 %
NRBC BLD-RTO: ABNORMAL /100{WBCS}
PLATELET # BLD AUTO: 278 X10*3/UL (ref 150–450)
RBC # BLD AUTO: 5.15 X10*6/UL (ref 4–5.2)
WBC # BLD AUTO: 8.4 X10*3/UL (ref 4.4–11.3)

## 2025-02-20 PROCEDURE — 3008F BODY MASS INDEX DOCD: CPT | Performed by: INTERNAL MEDICINE

## 2025-02-20 PROCEDURE — 99214 OFFICE O/P EST MOD 30 MIN: CPT | Performed by: INTERNAL MEDICINE

## 2025-02-20 PROCEDURE — 36415 COLL VENOUS BLD VENIPUNCTURE: CPT

## 2025-02-20 PROCEDURE — 3078F DIAST BP <80 MM HG: CPT | Performed by: INTERNAL MEDICINE

## 2025-02-20 PROCEDURE — 82728 ASSAY OF FERRITIN: CPT

## 2025-02-20 PROCEDURE — 83550 IRON BINDING TEST: CPT

## 2025-02-20 PROCEDURE — 86334 IMMUNOFIX E-PHORESIS SERUM: CPT

## 2025-02-20 PROCEDURE — 82105 ALPHA-FETOPROTEIN SERUM: CPT

## 2025-02-20 PROCEDURE — 1036F TOBACCO NON-USER: CPT | Performed by: INTERNAL MEDICINE

## 2025-02-20 PROCEDURE — 84155 ASSAY OF PROTEIN SERUM: CPT

## 2025-02-20 PROCEDURE — 85025 COMPLETE CBC W/AUTO DIFF WBC: CPT

## 2025-02-20 PROCEDURE — 3074F SYST BP LT 130 MM HG: CPT | Performed by: INTERNAL MEDICINE

## 2025-02-20 PROCEDURE — 83521 IG LIGHT CHAINS FREE EACH: CPT

## 2025-02-20 PROCEDURE — 80053 COMPREHEN METABOLIC PANEL: CPT

## 2025-02-20 RX ORDER — MINOXIDIL 50 MG/G
AEROSOL, FOAM TOPICAL
COMMUNITY

## 2025-02-20 ASSESSMENT — PAIN SCALES - GENERAL: PAINLEVEL_OUTOF10: 0-NO PAIN

## 2025-02-20 NOTE — PROGRESS NOTES
Return in 6 months.       No phlebotomy today as Iron studies pending.  Patient will call to review Iron studies and if needed will add on for phlebotomy in next several weeks.  Nurse reiterated importance of having labs prior to FUV.  Patient teaching provided regarding [facility] transition and patient notified paper lab requisition will be required for non-[facility] labs.   Patient will come to [facility] for labs.  Call back instructions reviewed.  Patient verbalized understanding.

## 2025-02-20 NOTE — PROGRESS NOTES
Patient ID: Mirna De La Vega is a 49 y.o. female.  Referring Physician: Jarvis Manzano MD  [address] [facility], [place] [address]  Primary Care Provider: Ludivina Luis MD  Visit Type: Initial Visit  The patient was referred to me for further evaluation and management of elevated free kappa light chains at 2.16 mg/dL reference range 0.33 to 1.94 mg/dL.  Serum protein electrophoresis did not reveal M protein.  Bone survey did not reveal any osteolytic lesions.  MGUS.    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  H63D was not detected.  Subjective    HPI  The patient is a 48-year-old woman with past medical history of meningioma, s/p resection, epilepsy, elevated creatinine.  The patient is asymptomatic.  She was evaluated for elevated creatinine at 1.26 mg/dL and hyperglycemia.  As a part of evaluation she was noted to have elevated free kappa light chains and was referred for further evaluation and management.    At interview on 2023 the patient denied history of weight loss, fevers, night sweats, bone pains, constipation, confusion, chest pain, shortness of breath, nausea, vomiting, hematemesis, melena, hematochezia and hematuria.    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  The patient is asymptomatic.    The patient came for follow-up on 2025 regarding history of elevated free kappa light chains and heterozygosity for C2 82Y hereditary hemochromatosis gene mutation.  The patient is asymptomatic.    Past medical history:    History of meningioma s/p resection, history of epilepsy, well-controlled, history of carrier/mutation for hemochromatosis the patient does not recall details.    Past surgical history:    History of , hernia repair, left frontal lobe resection for meningioma, status  "postcraniotomy.    Family history:    Mother had eczema diabetes, father had lipidemia and thyroid cancer.  Brother has anxiety depression and astrocytoma.  Maternal uncle had alcohol dependence.    Personal history and social history:    48 years old,  has 1 daughter Works at [employer].  Denies history of smoking alcohol abuse drug abuse.    Review of Systems   All other systems reviewed and are negative.       Objective   BSA: 2.01 meters squared  /74   Pulse 83   Temp 36.5 °C (97.7 °F) (Temporal)   Resp 16   Ht 1.718 m (5' 7.64\")   Wt 84.5 kg (186 lb 4.6 oz)   SpO2 100%   BMI 28.63 kg/m²      has a past medical history of Carbuncle, unspecified (2013), Diabetes mellitus (Multi), Epidermal cyst (2013), Essential (primary) hypertension (2013), Gross hematuria (2013), Other fatigue (2013), Paronychia, finger (10/16/2023), and Personal history of other specified conditions (10/16/2018).   has a past surgical history that includes Hernia repair (2018); Other surgical history (2016); and  section, low transverse.  Family History   Problem Relation Name Age of Onset    Diabetes Mother      Hyperlipidemia Mother      Deep vein thrombosis Father      Diabetes Father      Hyperlipidemia Father      Leukemia Father      Other (thymus cancer) Father      Myelodysplastic syndrome Father      Other (astrocytoma) Brother      Hemochromatosis Brother       Oncology History    No history exists.       Mirna MACIEL Ceferino  reports that she has never smoked. She has never been exposed to tobacco smoke. She has never used smokeless tobacco.  She  reports no history of alcohol use.  She  reports no history of drug use.    Physical Exam  Constitutional:       Appearance: Normal appearance.   HENT:      Head: Normocephalic and atraumatic.      Nose: Nose normal.      Mouth/Throat:      Mouth: Mucous membranes are moist.      Pharynx: Oropharynx is clear. "   Eyes:      Extraocular Movements: Extraocular movements intact.      Conjunctiva/sclera: Conjunctivae normal.      Pupils: Pupils are equal, round, and reactive to light.   Cardiovascular:      Rate and Rhythm: Normal rate and regular rhythm.   Pulmonary:      Effort: Pulmonary effort is normal.      Breath sounds: Normal breath sounds.   Abdominal:      General: Abdomen is flat. Bowel sounds are normal.      Palpations: Abdomen is soft.   Musculoskeletal:         General: Normal range of motion.      Cervical back: Normal range of motion and neck supple.   Neurological:      General: No focal deficit present.      Mental Status: She is alert and oriented to person, place, and time. Mental status is at baseline.   Psychiatric:         Mood and Affect: Mood normal.         Behavior: Behavior normal.         Thought Content: Thought content normal.         Judgment: Judgment normal.         WBC   Date/Time Value Ref Range Status   02/20/2025 02:51 PM 8.4 4.4 - 11.3 x10*3/uL Final   10/05/2024 09:16 AM 7.9 4.4 - 11.3 x10*3/uL Final   08/08/2024 10:30 AM 7.6 4.4 - 11.3 x10*3/uL Final     nRBC   Date Value Ref Range Status   02/20/2025   Final     Comment:     Not Measured   10/05/2024 0.0 0.0 - 0.0 /100 WBCs Final   08/08/2024   Final     Comment:     Not Measured     RBC   Date Value Ref Range Status   02/20/2025 5.15 4.00 - 5.20 x10*6/uL Final   10/05/2024 5.01 4.00 - 5.20 x10*6/uL Final   08/08/2024 5.33 (H) 4.00 - 5.20 x10*6/uL Final     Hemoglobin   Date Value Ref Range Status   02/20/2025 14.9 12.0 - 16.0 g/dL Final   10/05/2024 14.1 12.0 - 16.0 g/dL Final   08/08/2024 14.9 12.0 - 16.0 g/dL Final     Hematocrit   Date Value Ref Range Status   02/20/2025 47.4 (H) 36.0 - 46.0 % Final   10/05/2024 45.7 36.0 - 46.0 % Final   08/08/2024 48.2 (H) 36.0 - 46.0 % Final     MCV   Date/Time Value Ref Range Status   02/20/2025 02:51 PM 92 80 - 100 fL Final   10/05/2024 09:16 AM 91 80 - 100 fL Final   08/08/2024 10:30 AM 90  "80 - 100 fL Final     MCH   Date/Time Value Ref Range Status   02/20/2025 02:51 PM 28.9 26.0 - 34.0 pg Final   10/05/2024 09:16 AM 28.1 26.0 - 34.0 pg Final   08/08/2024 10:30 AM 28.0 26.0 - 34.0 pg Final     MCHC   Date/Time Value Ref Range Status   02/20/2025 02:51 PM 31.4 (L) 32.0 - 36.0 g/dL Final   10/05/2024 09:16 AM 30.9 (L) 32.0 - 36.0 g/dL Final   08/08/2024 10:30 AM 30.9 (L) 32.0 - 36.0 g/dL Final     RDW   Date/Time Value Ref Range Status   02/20/2025 02:51 PM 14.0 11.5 - 14.5 % Final   10/05/2024 09:16 AM 14.0 11.5 - 14.5 % Final   08/08/2024 10:30 AM 14.6 (H) 11.5 - 14.5 % Final     Platelets   Date/Time Value Ref Range Status   02/20/2025 02:51  150 - 450 x10*3/uL Final   10/05/2024 09:16  150 - 450 x10*3/uL Final   08/08/2024 10:30  150 - 450 x10*3/uL Final     No results found for: \"MPV\"  Neutrophils %   Date/Time Value Ref Range Status   02/20/2025 02:51 PM 77.1 40.0 - 80.0 % Final   08/08/2024 10:30 AM 80.4 40.0 - 80.0 % Final   06/14/2024 02:15 PM 82.6 40.0 - 80.0 % Final     Immature Granulocytes %, Automated   Date/Time Value Ref Range Status   02/20/2025 02:51 PM 0.2 0.0 - 0.9 % Final     Comment:     Immature Granulocyte Count (IG) includes promyelocytes, myelocytes and metamyelocytes but does not include bands. Percent differential counts (%) should be interpreted in the context of the absolute cell counts (cells/UL).   08/08/2024 10:30 AM 0.3 0.0 - 0.9 % Final     Comment:     Immature Granulocyte Count (IG) includes promyelocytes, myelocytes and metamyelocytes but does not include bands. Percent differential counts (%) should be interpreted in the context of the absolute cell counts (cells/UL).   06/14/2024 02:15 PM 0.2 0.0 - 0.9 % Final     Comment:     Immature Granulocyte Count (IG) includes promyelocytes, myelocytes and metamyelocytes but does not include bands. Percent differential counts (%) should be interpreted in the context of the absolute cell counts (cells/UL). "     Lymphocytes %   Date/Time Value Ref Range Status   02/20/2025 02:51 PM 14.1 13.0 - 44.0 % Final   08/08/2024 10:30 AM 10.3 13.0 - 44.0 % Final   06/14/2024 02:15 PM 11.3 13.0 - 44.0 % Final     Monocytes %   Date/Time Value Ref Range Status   02/20/2025 02:51 PM 8.2 2.0 - 10.0 % Final   08/08/2024 10:30 AM 7.4 2.0 - 10.0 % Final   06/14/2024 02:15 PM 4.7 2.0 - 10.0 % Final     Eosinophils %   Date/Time Value Ref Range Status   02/20/2025 02:51 PM 0.2 0.0 - 6.0 % Final   08/08/2024 10:30 AM 1.2 0.0 - 6.0 % Final   06/14/2024 02:15 PM 1.0 0.0 - 6.0 % Final     Basophils %   Date/Time Value Ref Range Status   02/20/2025 02:51 PM 0.2 0.0 - 2.0 % Final   08/08/2024 10:30 AM 0.4 0.0 - 2.0 % Final   06/14/2024 02:15 PM 0.2 0.0 - 2.0 % Final     Neutrophils Absolute   Date/Time Value Ref Range Status   02/20/2025 02:51 PM 6.50 1.20 - 7.70 x10*3/uL Final     Comment:     Percent differential counts (%) should be interpreted in the context of the absolute cell counts (cells/uL).   08/08/2024 10:30 AM 6.08 1.20 - 7.70 x10*3/uL Final     Comment:     Percent differential counts (%) should be interpreted in the context of the absolute cell counts (cells/uL).   06/14/2024 02:15 PM 6.79 1.20 - 7.70 x10*3/uL Final     Comment:     Percent differential counts (%) should be interpreted in the context of the absolute cell counts (cells/uL).     Immature Granulocytes Absolute, Automated   Date/Time Value Ref Range Status   02/20/2025 02:51 PM 0.02 0.00 - 0.70 x10*3/uL Final   08/08/2024 10:30 AM 0.02 0.00 - 0.70 x10*3/uL Final   06/14/2024 02:15 PM 0.02 0.00 - 0.70 x10*3/uL Final     Lymphocytes Absolute   Date/Time Value Ref Range Status   02/20/2025 02:51 PM 1.19 (L) 1.20 - 4.80 x10*3/uL Final   08/08/2024 10:30 AM 0.78 (L) 1.20 - 4.80 x10*3/uL Final   06/14/2024 02:15 PM 0.93 (L) 1.20 - 4.80 x10*3/uL Final     Monocytes Absolute   Date/Time Value Ref Range Status   02/20/2025 02:51 PM 0.69 0.10 - 1.00 x10*3/uL Final   08/08/2024  "10:30 AM 0.56 0.10 - 1.00 x10*3/uL Final   06/14/2024 02:15 PM 0.39 0.10 - 1.00 x10*3/uL Final     Eosinophils Absolute   Date/Time Value Ref Range Status   02/20/2025 02:51 PM 0.02 0.00 - 0.70 x10*3/uL Final   08/08/2024 10:30 AM 0.09 0.00 - 0.70 x10*3/uL Final   06/14/2024 02:15 PM 0.08 0.00 - 0.70 x10*3/uL Final     Basophils Absolute   Date/Time Value Ref Range Status   02/20/2025 02:51 PM 0.02 0.00 - 0.10 x10*3/uL Final   08/08/2024 10:30 AM 0.03 0.00 - 0.10 x10*3/uL Final   06/14/2024 02:15 PM 0.02 0.00 - 0.10 x10*3/uL Final       No components found for: \"PT\"  No results found for: \"APTT\"    Assessment/Plan    The patient is a 48-year-old woman with past medical history of meningioma, s/p resection, epilepsy, elevated creatinine.  The patient is asymptomatic.  She was evaluated for elevated creatinine at 1.26 mg/dL and hyperglycemia.  As a part of evaluation she was noted to have elevated free kappa light chains and was referred for further evaluation and management.  Physical examination was within normal limits.  Reviewed lab data with the patient.  Elevated free kappa light chain occurs in less than 1% of population below the age of 60.  I have recommended a bone survey.  If there are no osteolytic lesions that pretty much rules out multiple myeloma.  However, to be certain 1 could consider a bone marrow aspiration and biopsy.  The patient would like to think about it.  There are no therapeutic recommendations today.  Thank you for allowing me to participate in care of your patient if you have any questions please feel free to call me.    Heterozygous c.845G>A (p.C282Y) mutation in HFE gene was detected but negative for H63D mutation:    The patient had come for follow-up on August 7, 2024 discussed in detail with the patient.  The patient is currently premenopausal and has regular menstrual period.  If desires could consider blood donation every 56 days.  If not consider therapeutic phlebotomies every 4 " weeks to maintain hemoglobin at or under 15 g/dL.  Siblings and children need to be tested for the same.  Hemoglobin 14 g/dL today.  Hold phlebotomy today.  Return in 6 months.    The patient came for follow-up on February 20, 2025 regarding history of elevated free kappa light chains and heterozygosity for C2 82Y hereditary hemochromatosis gene mutation.  The patient is asymptomatic.    Elevated free kappa light chains 2.14 mg/dL, bone survey negative/MGUS.    To be followed clinically.    Return in 6 months.     Diagnoses and all orders for this visit:  Elevated hematocrit  -     Referral to Hematology and Oncology  -     Clinic Appointment Request; Future         Jarvis Manzano MD

## 2025-02-21 ENCOUNTER — TELEPHONE (OUTPATIENT)
Dept: HEMATOLOGY/ONCOLOGY | Facility: CLINIC | Age: 50
End: 2025-02-21
Payer: COMMERCIAL

## 2025-02-21 LAB
AFP SERPL-MCNC: <4 NG/ML (ref 0–9)
ALBUMIN SERPL BCP-MCNC: 4.4 G/DL (ref 3.4–5)
ALP SERPL-CCNC: 56 U/L (ref 33–110)
ALT SERPL W P-5'-P-CCNC: 20 U/L (ref 7–45)
ANION GAP SERPL CALC-SCNC: 11 MMOL/L (ref 10–20)
AST SERPL W P-5'-P-CCNC: 20 U/L (ref 9–39)
BILIRUB SERPL-MCNC: 0.4 MG/DL (ref 0–1.2)
BUN SERPL-MCNC: 17 MG/DL (ref 6–23)
CALCIUM SERPL-MCNC: 9.5 MG/DL (ref 8.6–10.6)
CHLORIDE SERPL-SCNC: 103 MMOL/L (ref 98–107)
CO2 SERPL-SCNC: 32 MMOL/L (ref 21–32)
CREAT SERPL-MCNC: 1.28 MG/DL (ref 0.5–1.05)
EGFRCR SERPLBLD CKD-EPI 2021: 52 ML/MIN/1.73M*2
FERRITIN SERPL-MCNC: 52 NG/ML (ref 8–150)
GLUCOSE SERPL-MCNC: 88 MG/DL (ref 74–99)
IRON SATN MFR SERPL: 17 % (ref 25–45)
IRON SERPL-MCNC: 50 UG/DL (ref 35–150)
KAPPA LC SERPL-MCNC: 1.97 MG/DL (ref 0.33–1.94)
KAPPA LC/LAMBDA SER: 2.53 {RATIO} (ref 0.26–1.65)
LAMBDA LC SERPL-MCNC: 0.78 MG/DL (ref 0.57–2.63)
POTASSIUM SERPL-SCNC: 4.8 MMOL/L (ref 3.5–5.3)
PROT SERPL-MCNC: 6.7 G/DL (ref 6.4–8.2)
PROT SERPL-MCNC: 6.7 G/DL (ref 6.4–8.2)
SODIUM SERPL-SCNC: 141 MMOL/L (ref 136–145)
TIBC SERPL-MCNC: 292 UG/DL (ref 240–445)
UIBC SERPL-MCNC: 242 UG/DL (ref 110–370)

## 2025-02-21 NOTE — TELEPHONE ENCOUNTER
Phone call returned to pt to answer pt's questions regarding her Hbg/lab results form 2/20/25 FUV. Reviewed lab results again with pt and explained parameters for phlebotomy, and that pt did not meet the parameter for a phleb. at her 2/20/25 visit with Dr Manzano. Pt verbalized understanding per teach back method, and states she will have her labs drawn in advance of her next FUV in 6 months and call sooner for any new issues or concerns. Pt noted that she did start her period today.

## 2025-02-21 NOTE — TELEPHONE ENCOUNTER
Patient saw Dr. Manzano and had labs yesterday.    She states if her iron levels are above 15 then blood needs to be removed.  She reviewed her labs and they appear normal.    She would like someone to call her back and educate her.

## 2025-02-24 LAB
ALBUMIN: 4 G/DL (ref 3.4–5)
ALPHA 1 GLOBULIN: 0.3 G/DL (ref 0.2–0.6)
ALPHA 2 GLOBULIN: 0.6 G/DL (ref 0.4–1.1)
BETA GLOBULIN: 0.7 G/DL (ref 0.5–1.2)
GAMMA GLOBULIN: 1 G/DL (ref 0.5–1.4)
IMMUNOFIXATION COMMENT: NORMAL
PATH REVIEW - SERUM IMMUNOFIXATION: NORMAL
PATH REVIEW-SERUM PROTEIN ELECTROPHORESIS: NORMAL
PROTEIN ELECTROPHORESIS COMMENT: NORMAL

## 2025-02-25 ENCOUNTER — TELEPHONE (OUTPATIENT)
Dept: HEMATOLOGY/ONCOLOGY | Facility: CLINIC | Age: 50
End: 2025-02-25
Payer: COMMERCIAL

## 2025-02-25 NOTE — TELEPHONE ENCOUNTER
Call returned to patient.  States concerns regarding lab results from 2/20/25 and renal history.  Has multiple questions regarding genetic testing and would like a follow up appt with Dr. Manzano to discuss results in detail.  Pt states she is currently at work and ok to send a [employer] message with new appt details.  Will schedule for 3/20/25 @ 9:40, arrival at 9:20.

## 2025-02-25 NOTE — TELEPHONE ENCOUNTER
Patient reviewing lab results and would like more information on kappa/lambda results.    She does not return until July.

## 2025-02-28 LAB
ALT SERPL-CCNC: 21 U/L (ref 6–29)
ANION GAP SERPL CALCULATED.4IONS-SCNC: 10 MMOL/L (CALC) (ref 7–17)
BUN SERPL-MCNC: 14 MG/DL (ref 7–25)
BUN/CREAT SERPL: 13 (CALC) (ref 6–22)
CALCIUM SERPL-MCNC: 9.4 MG/DL (ref 8.6–10.2)
CHLORIDE SERPL-SCNC: 104 MMOL/L (ref 98–110)
CO2 SERPL-SCNC: 26 MMOL/L (ref 20–32)
CREAT SERPL-MCNC: 1.12 MG/DL (ref 0.5–0.99)
EGFRCR SERPLBLD CKD-EPI 2021: 61 ML/MIN/1.73M2
ERYTHROCYTE [DISTWIDTH] IN BLOOD BY AUTOMATED COUNT: 13.4 % (ref 11–15)
EST. AVERAGE GLUCOSE BLD GHB EST-MCNC: 117 MG/DL
EST. AVERAGE GLUCOSE BLD GHB EST-SCNC: 6.5 MMOL/L
GLUCOSE SERPL-MCNC: 77 MG/DL (ref 65–99)
HBA1C MFR BLD: 5.7 % OF TOTAL HGB
HCT VFR BLD AUTO: 49.1 % (ref 35–45)
HGB BLD-MCNC: 15.1 G/DL (ref 11.7–15.5)
MCH RBC QN AUTO: 28.5 PG (ref 27–33)
MCHC RBC AUTO-ENTMCNC: 30.8 G/DL (ref 32–36)
MCV RBC AUTO: 92.8 FL (ref 80–100)
PLATELET # BLD AUTO: 345 THOUSAND/UL (ref 140–400)
PMV BLD REES-ECKER: 9.3 FL (ref 7.5–12.5)
POTASSIUM SERPL-SCNC: 4.7 MMOL/L (ref 3.5–5.3)
RBC # BLD AUTO: 5.29 MILLION/UL (ref 3.8–5.1)
SODIUM SERPL-SCNC: 140 MMOL/L (ref 135–146)
WBC # BLD AUTO: 8.7 THOUSAND/UL (ref 3.8–10.8)

## 2025-03-12 ENCOUNTER — TELEPHONE (OUTPATIENT)
Dept: PRIMARY CARE | Facility: CLINIC | Age: 50
End: 2025-03-12
Payer: COMMERCIAL

## 2025-03-12 NOTE — TELEPHONE ENCOUNTER
Orthopedics Pt called, lvm, she has been experiencing muscle aches. Pt states her right index finger is swollen and her hands are achy overall. Pt does workout everyday but this started when she began taking atorvastatin (Lipitor) 10 mg tablet. Please advise.

## 2025-03-13 DIAGNOSIS — D69.6 THROMBOCYTOPENIA (CMS-HCC): ICD-10-CM

## 2025-03-17 LAB
ALBUMIN SERPL-MCNC: 4.1 G/DL (ref 3.6–5.1)
ALP SERPL-CCNC: 56 U/L (ref 31–125)
ALT SERPL-CCNC: 20 U/L (ref 6–29)
ANION GAP SERPL CALCULATED.4IONS-SCNC: 8 MMOL/L (CALC) (ref 7–17)
AST SERPL-CCNC: 23 U/L (ref 10–35)
BASOPHILS # BLD AUTO: 32 CELLS/UL (ref 0–200)
BASOPHILS NFR BLD AUTO: 0.4 %
BILIRUB SERPL-MCNC: 0.4 MG/DL (ref 0.2–1.2)
BUN SERPL-MCNC: 18 MG/DL (ref 7–25)
CALCIUM SERPL-MCNC: 9.3 MG/DL (ref 8.6–10.2)
CHLORIDE SERPL-SCNC: 103 MMOL/L (ref 98–110)
CK SERPL-CCNC: 193 U/L (ref 20–239)
CO2 SERPL-SCNC: 29 MMOL/L (ref 20–32)
CREAT SERPL-MCNC: 1.19 MG/DL (ref 0.5–0.99)
EGFRCR SERPLBLD CKD-EPI 2021: 57 ML/MIN/1.73M2
EOSINOPHIL # BLD AUTO: 47 CELLS/UL (ref 15–500)
EOSINOPHIL NFR BLD AUTO: 0.6 %
ERYTHROCYTE [DISTWIDTH] IN BLOOD BY AUTOMATED COUNT: 13 % (ref 11–15)
GLUCOSE SERPL-MCNC: 69 MG/DL (ref 65–99)
HCT VFR BLD AUTO: 45.3 % (ref 35–45)
HGB BLD-MCNC: 14.3 G/DL (ref 11.7–15.5)
LYMPHOCYTES # BLD AUTO: 1375 CELLS/UL (ref 850–3900)
LYMPHOCYTES NFR BLD AUTO: 17.4 %
MCH RBC QN AUTO: 28.4 PG (ref 27–33)
MCHC RBC AUTO-ENTMCNC: 31.6 G/DL (ref 32–36)
MCV RBC AUTO: 89.9 FL (ref 80–100)
MONOCYTES # BLD AUTO: 600 CELLS/UL (ref 200–950)
MONOCYTES NFR BLD AUTO: 7.6 %
NEUTROPHILS # BLD AUTO: 5846 CELLS/UL (ref 1500–7800)
NEUTROPHILS NFR BLD AUTO: 74 %
PLATELET # BLD AUTO: 277 THOUSAND/UL (ref 140–400)
PMV BLD REES-ECKER: 9.1 FL (ref 7.5–12.5)
POTASSIUM SERPL-SCNC: 4.6 MMOL/L (ref 3.5–5.3)
PROT SERPL-MCNC: 6.5 G/DL (ref 6.1–8.1)
RBC # BLD AUTO: 5.04 MILLION/UL (ref 3.8–5.1)
SODIUM SERPL-SCNC: 140 MMOL/L (ref 135–146)
WBC # BLD AUTO: 7.9 THOUSAND/UL (ref 3.8–10.8)

## 2025-03-20 ENCOUNTER — APPOINTMENT (OUTPATIENT)
Dept: HEMATOLOGY/ONCOLOGY | Facility: CLINIC | Age: 50
End: 2025-03-20
Payer: COMMERCIAL

## 2025-03-24 ENCOUNTER — TELEPHONE (OUTPATIENT)
Dept: OBSTETRICS AND GYNECOLOGY | Facility: CLINIC | Age: 50
End: 2025-03-24
Payer: COMMERCIAL

## 2025-03-24 NOTE — TELEPHONE ENCOUNTER
Patient of Dr. Harris stated got a letter in the mail stating she had dense breasts on her mammogram and wasn't sure if she needed additional testing done? Can we call her back at [phone].   Thank you!

## 2025-03-27 ENCOUNTER — APPOINTMENT (OUTPATIENT)
Dept: HEMATOLOGY/ONCOLOGY | Facility: CLINIC | Age: 50
End: 2025-03-27
Payer: COMMERCIAL

## 2025-04-02 ENCOUNTER — APPOINTMENT (OUTPATIENT)
Dept: DERMATOLOGY | Facility: CLINIC | Age: 50
End: 2025-04-02
Payer: COMMERCIAL

## 2025-04-02 DIAGNOSIS — L81.4 LENTIGO: ICD-10-CM

## 2025-04-02 DIAGNOSIS — L82.1 SEBORRHEIC KERATOSIS: ICD-10-CM

## 2025-04-02 DIAGNOSIS — L98.9 DERMATOLOGIC PROBLEM: ICD-10-CM

## 2025-04-02 DIAGNOSIS — L81.9 DYSCHROMIA: ICD-10-CM

## 2025-04-02 DIAGNOSIS — L30.8 OTHER ECZEMA: ICD-10-CM

## 2025-04-02 DIAGNOSIS — L70.0 ACNE VULGARIS: Primary | ICD-10-CM

## 2025-04-02 PROCEDURE — 99204 OFFICE O/P NEW MOD 45 MIN: CPT | Performed by: DERMATOLOGY

## 2025-04-02 PROCEDURE — 1036F TOBACCO NON-USER: CPT | Performed by: DERMATOLOGY

## 2025-04-02 RX ORDER — AZELAIC ACID 0.15 G/G
GEL TOPICAL
Qty: 50 G | Refills: 1 | Status: SHIPPED | OUTPATIENT
Start: 2025-04-02

## 2025-04-02 RX ORDER — CLOBETASOL PROPIONATE 0.5 MG/G
CREAM TOPICAL
Qty: 45 G | Refills: 1 | Status: SHIPPED | OUTPATIENT
Start: 2025-04-02

## 2025-04-02 NOTE — PROGRESS NOTES
Subjective     Mirna De La Vega is a 49 y.o. female who presents for the following: Acne (Face/neck. Pt states treatment with OTC Neutrogena products and clindamycin 1% lotion with good response. ) and Eczema (Bilateral hands. Pt states treatment with triamcinolone 0.1% cream and OTC dove soap with good response.).     Review of Systems:  No other skin or systemic complaints other than what is documented elsewhere in the note.    The following portions of the chart were reviewed this encounter and updated as appropriate:   Tobacco  Allergies  Meds  Problems  Med Hx  Surg Hx  Fam Hx                Objective   Well appearing patient in no apparent distress; mood and affect are within normal limits.    A focused skin examination was performed. All findings within normal limits unless otherwise noted below.    Assessment/Plan   1. Acne vulgaris  Head - Anterior (Face)  Scattered inflammatory papules                  -Discussed diagnosis of acne  -Discussed natural history of condition and expectations for treatment  -Recommend to start azelaic acid once daily  -Patient with history of eczema    Related Medications  azelaic acid (Finacea) 15 % gel  Apply to the face once daily    2. 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 Use less than 14 days per  month.    3. Dyschromia  Head - Anterior (Face)  Hyperpigmented macule(s)/patch(es)    -Discussed the nature of condition  -Discussed this condition is sometimes challenging to treat  -Treatment will require strict avoidance of the sun and sun protection with SPF at least 50 on a daily basis  -Recommend to start azelaic acid  -May need to add HQ or tretinoin if tolerated (patient has eczema)      4. Lentigo  Right Upper Eyelid  Tan macules    -Benign appearing on exam  -Reassurance, recommend observation    5. Seborrheic keratosis  Left Malar Cheek  Stuck on, waxy papule with comedo-like openings and milia like cysts    (lesion of patient's concern)  -Discussed the nature of the diagnosis  -Reassurance, recommend continued observation    6. Dermatologic problem    Related Procedures  Follow Up In Dermatology - Established Patient        Follow up in 3 months for acne, dyschromia and eczema  Discussed if there are any changes or development of concerning symptoms (lesion/skin condition is changing, bleeding, enlarging, or worsening) the patient is to contact my office. The patient verbalizes understanding.    Ramya Frank MD  4/2/2025

## 2025-04-04 ENCOUNTER — TELEPHONE (OUTPATIENT)
Dept: DERMATOLOGY | Facility: CLINIC | Age: 50
End: 2025-04-04
Payer: COMMERCIAL

## 2025-04-29 ENCOUNTER — TELEPHONE (OUTPATIENT)
Dept: HEMATOLOGY/ONCOLOGY | Facility: CLINIC | Age: 50
End: 2025-04-29
Payer: COMMERCIAL

## 2025-04-29 NOTE — TELEPHONE ENCOUNTER
Reason for Conversation  Can we please call patient to reschedule her 8/29 infusion?    Background       Disposition   No disposition on file.

## 2025-04-30 ENCOUNTER — PATIENT MESSAGE (OUTPATIENT)
Dept: HEMATOLOGY/ONCOLOGY | Facility: CLINIC | Age: 50
End: 2025-04-30
Payer: COMMERCIAL

## 2025-05-04 DIAGNOSIS — N18.31 STAGE 3A CHRONIC KIDNEY DISEASE (MULTI): Primary | ICD-10-CM

## 2025-05-06 ENCOUNTER — TELEPHONE (OUTPATIENT)
Dept: DERMATOLOGY | Facility: CLINIC | Age: 50
End: 2025-05-06
Payer: COMMERCIAL

## 2025-05-06 NOTE — TELEPHONE ENCOUNTER
Pt called and states that she was using Neutrogena night cream with her azelaic acid gel and that had seemed to irritate and dry out her skin. Pt states she took a break form the gel and restarted it again for 2 weeks every day and skin is still irritated.  Pt inquires if she should continue to stop the azelaic acid gel and restart in September before her October appointment. After discussion with Dr. Terry Frank, it is recommended that pt stopped the Neutrogena night cream and azelaic acid gel for 2 weeks. Then, continue the azelaic acid gel only, every other day, for 2 weeks. Pt verbalized understanding and has no further questions or concerns.    Chanda EDWARDSN RN

## 2025-05-12 ENCOUNTER — OFFICE VISIT (OUTPATIENT)
Dept: OBSTETRICS AND GYNECOLOGY | Facility: CLINIC | Age: 50
End: 2025-05-12
Payer: COMMERCIAL

## 2025-05-12 ENCOUNTER — TELEPHONE (OUTPATIENT)
Dept: DERMATOLOGY | Facility: CLINIC | Age: 50
End: 2025-05-12

## 2025-05-12 VITALS
HEIGHT: 69 IN | DIASTOLIC BLOOD PRESSURE: 72 MMHG | BODY MASS INDEX: 26.66 KG/M2 | WEIGHT: 180 LBS | SYSTOLIC BLOOD PRESSURE: 130 MMHG

## 2025-05-12 DIAGNOSIS — N89.8 VAGINAL ODOR: ICD-10-CM

## 2025-05-12 DIAGNOSIS — T19.2XXA RETAINED TAMPON, INITIAL ENCOUNTER: ICD-10-CM
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
## 2025-05-12 ASSESSMENT — ENCOUNTER SYMPTOMS
CONSTITUTIONAL NEGATIVE: 1
RESPIRATORY NEGATIVE: 1
GASTROINTESTINAL NEGATIVE: 1
PSYCHIATRIC NEGATIVE: 1
NEUROLOGICAL NEGATIVE: 1

## 2025-05-12 NOTE — TELEPHONE ENCOUNTER
Pt called office and states there is a lesion under eye that is getting larger over time and would like Dr. Terry Frank to assess it. RN informed pt this information will be sent to PAR to help set up an appointment for pt by calling her. Patient verbalized understanding and has no further questions or concerns.     Chanda EDWARDSN RN

## 2025-05-12 NOTE — PROGRESS NOTES
Subjective   Patient ID: Mirna De La Vega is a 49 y.o. female who presents for Infection (Same day add on. /Patient complains of vaginal odor and discharge that started 5/10/2025).  49 year old here for complaints of having vaginal odor.  She notes that the odor started about 3 days ago.  She noticed a slight odor the last few days of her period around 5/7-5/8 but then was okay for a few days. She had intercourse with her  after her period around 5/10.  She denies any ongoing vaginal discharge, itching and burning.  She has noticed increased urinary urgency, frequency and a slight odor with urination.  She denies any new partners, medication changes and no new lifestyle changes.         Review of Systems   Constitutional: Negative.    HENT: Negative.     Respiratory: Negative.     Gastrointestinal: Negative.    Genitourinary:  Positive for vaginal discharge.        Vaginal odor   Skin: Negative.    Neurological: Negative.    Psychiatric/Behavioral: Negative.         Objective   Physical Exam  Constitutional:       Appearance: Normal appearance.   Pulmonary:      Effort: Pulmonary effort is normal.   Abdominal:      Hernia: There is no hernia in the right inguinal area.   Genitourinary:     General: Normal vulva.      Exam position: Supine.      Labia:         Right: No rash, tenderness, lesion or injury.         Left: No rash, tenderness, lesion or injury.       Urethra: No prolapse, urethral pain, urethral swelling or urethral lesion.      Cervix: Normal.      Uterus: Normal.       Adnexa: Right adnexa normal and left adnexa normal.      Rectum: Normal.      Comments: Tampon in vaginal canal  Lymphadenopathy:      Lower Body: No right inguinal adenopathy.   Skin:     General: Skin is warm and dry.   Neurological:      Mental Status: She is alert.   Psychiatric:         Mood and Affect: Mood normal.         Behavior: Behavior normal.         Thought Content: Thought content normal.         Judgment: Judgment  normal.         Assessment/Plan   Problem List Items Addressed This Visit    None  Visit Diagnoses         Codes      Retained tampon, initial encounter    -  Primary T19.2XXA, W44.8XXA      Vaginal odor     N89.8        Retained tampon removed  Gram stain sent, will treat pending results if needed   Follow up as needed          Cece Chandra, QUINCY-CNP 05/12/25 10:40 AM

## 2025-05-13 ENCOUNTER — APPOINTMENT (OUTPATIENT)
Dept: GENETICS | Facility: CLINIC | Age: 50
End: 2025-05-13
Payer: COMMERCIAL

## 2025-05-13 DIAGNOSIS — N89.8 VAGINAL DISCHARGE: Primary | ICD-10-CM

## 2025-05-13 LAB — BV SCORE VAG QL: NORMAL

## 2025-05-13 RX ORDER — METRONIDAZOLE 7.5 MG/G
GEL VAGINAL NIGHTLY
Qty: 70 G | Refills: 0 | Status: SHIPPED | OUTPATIENT
Start: 2025-05-13 | End: 2025-05-18

## 2025-05-14 ENCOUNTER — TELEPHONE (OUTPATIENT)
Dept: OBSTETRICS AND GYNECOLOGY | Facility: CLINIC | Age: 50
End: 2025-05-14
Payer: COMMERCIAL

## 2025-05-14 LAB — BACTERIA UR CULT: NORMAL

## 2025-05-20 DIAGNOSIS — E11.9 TYPE 2 DIABETES MELLITUS WITHOUT COMPLICATION, WITHOUT LONG-TERM CURRENT USE OF INSULIN: ICD-10-CM

## 2025-05-21 RX ORDER — TIRZEPATIDE 5 MG/.5ML
5 INJECTION, SOLUTION SUBCUTANEOUS
Qty: 2 ML | Refills: 2 | Status: SHIPPED | OUTPATIENT
Start: 2025-05-25

## 2025-05-29 ENCOUNTER — TELEMEDICINE (OUTPATIENT)
Dept: PRIMARY CARE | Facility: CLINIC | Age: 50
End: 2025-05-29
Payer: COMMERCIAL

## 2025-05-29 DIAGNOSIS — R19.7 DIARRHEA, UNSPECIFIED TYPE: Primary | ICD-10-CM

## 2025-05-29 PROCEDURE — 99213 OFFICE O/P EST LOW 20 MIN: CPT | Performed by: STUDENT IN AN ORGANIZED HEALTH CARE EDUCATION/TRAINING PROGRAM

## 2025-05-29 PROCEDURE — 1036F TOBACCO NON-USER: CPT | Performed by: STUDENT IN AN ORGANIZED HEALTH CARE EDUCATION/TRAINING PROGRAM

## 2025-05-29 ASSESSMENT — ENCOUNTER SYMPTOMS
SHORTNESS OF BREATH: 0
HEADACHES: 0
PALPITATIONS: 0
FEVER: 0
NERVOUS/ANXIOUS: 0
DIARRHEA: 1
FREQUENCY: 0
DIZZINESS: 0
NUMBNESS: 0
NAUSEA: 0
DYSURIA: 0
WHEEZING: 0
COUGH: 0
DYSPHORIC MOOD: 0
HEMATURIA: 0
FATIGUE: 0
CONSTIPATION: 0
ABDOMINAL PAIN: 0
CHILLS: 0

## 2025-05-29 NOTE — LETTER
May 29, 2025     Patient: Mirna De La Vega   YOB: 1975   Date of Visit: 5/29/2025       To Whom It May Concern:    Mirna De La Vega was seen in my clinic on 5/29/2025 at 11:30 am. Please excuse Mirna for her absence from work on this day to make the appointment.    If you have any questions or concerns, please don't hesitate to call.         Sincerely,         Radha Kelly, DO        CC: No Recipients

## 2025-05-29 NOTE — PROGRESS NOTES
"Subjective   Patient ID: Mirna De La Vega is a 49 y.o. female who presents for Diarrhea.    HPI   Virtual or Telephone Consent    An interactive audio and video telecommunication system which permits real time communications between the patient (at the originating site) and provider (at the distant site) was utilized to provide this telehealth service.   Verbal consent was requested and obtained from Mirna De La Vega on this date, 05/29/25 for a telehealth visit and the patient's location was confirmed at the time of the visit.     Starting yesterday, she felt fine in the morning, and as the day went on she started getting GI symptoms. The night before she had some a turkey burger at about 2-3 pm. Both she made at home. Turkey burger was from the night before but stored appropriately.  She felt full and had no appetite when she woke up but otherwise felt ok. She is on monjoura and that isn't unusual. Started getting some upset stomach in the morning. Worked out and felt ok. Hadn't eaten but made her self eat something. Banana and toast. Then she started having diarrhea. No cramping.   Has had a lot of episodes. Watery. No blood.   She had a \"sour belch\" that was burning as well. Belching has improved a little bit  No fever or vomiting.  Appetite is still not as good.   She took some pepcid ac for her \"sour belch\" which didn't help.   She does have a hx of reflux.   EGD and colonoscopy in 2023.   No recent abx  She has no taken imodium.   Had a banana and water this morning.   She hasn't been doing electrolyte replacement.    Review of Systems   Constitutional:  Negative for chills, fatigue and fever.   Respiratory:  Negative for cough, shortness of breath and wheezing.    Cardiovascular:  Negative for chest pain, palpitations and leg swelling.   Gastrointestinal:  Positive for diarrhea. Negative for abdominal pain, constipation and nausea.   Genitourinary:  Negative for dysuria, frequency, hematuria and urgency. "   Neurological:  Negative for dizziness, numbness and headaches.   Psychiatric/Behavioral:  Negative for dysphoric mood. The patient is not nervous/anxious.        Objective   LMP 05/02/2025     Physical Exam  Constitutional:       General: She is not in acute distress.  Pulmonary:      Effort: Pulmonary effort is normal. No respiratory distress.   Neurological:      General: No focal deficit present.      Mental Status: She is alert.   Psychiatric:         Mood and Affect: Mood normal.         Behavior: Behavior normal.         Thought Content: Thought content normal.         Assessment/Plan   Problem List Items Addressed This Visit    None  Visit Diagnoses         Diarrhea, unspecified type    -  Primary          Recommended Imodium and omeprazole.  Recommended increasing electrolyte intake.    Radha Kelly, DO  5/29/2025

## 2025-06-12 ENCOUNTER — TELEPHONE (OUTPATIENT)
Dept: DERMATOLOGY | Facility: CLINIC | Age: 50
End: 2025-06-12
Payer: COMMERCIAL

## 2025-06-12 NOTE — TELEPHONE ENCOUNTER
Pt called and stated she has a concern with her toenails. Pt stated she has noticed her toenails getting thicker over time and her R toe is growing in with a crack on the nail plate. Pt would like to keep her October appointment, but is inquiring if another appointment can be made for her toenails. RN informed pt this will be sent to our scheduling team and the team will contact her to help schedule an appointment. Patient verbalized understanding and has no further questions or concerns.     Chanda EDWARDSN RN

## 2025-06-13 ENCOUNTER — APPOINTMENT (OUTPATIENT)
Facility: CLINIC | Age: 50
End: 2025-06-13
Payer: COMMERCIAL

## 2025-06-16 ENCOUNTER — APPOINTMENT (OUTPATIENT)
Dept: DERMATOLOGY | Facility: CLINIC | Age: 50
End: 2025-06-16
Payer: COMMERCIAL

## 2025-06-16 ENCOUNTER — TELEPHONE (OUTPATIENT)
Dept: DERMATOLOGY | Facility: CLINIC | Age: 50
End: 2025-06-16

## 2025-06-16 NOTE — TELEPHONE ENCOUNTER
Pt called and left voicemail that she is canceling her appt on 6.16.25 due to not knowing what it was scheduled for and will be seen in October. RN called pt and notified her that it was for her toenail. Pt states she will just be seen for that issue if still present in October due to not driving right now as pt is trying to be weaned off her seizure medications. Pt verbalized understanding and has no further questions or concerns.      Keturah Art, RN

## 2025-06-17 DIAGNOSIS — N89.8 VAGINAL DISCHARGE: ICD-10-CM

## 2025-06-17 RX ORDER — METRONIDAZOLE 7.5 MG/G
GEL VAGINAL NIGHTLY
Qty: 70 G | Refills: 0 | OUTPATIENT
Start: 2025-06-17 | End: 2025-06-22

## 2025-06-25 ENCOUNTER — APPOINTMENT (OUTPATIENT)
Dept: DERMATOLOGY | Facility: CLINIC | Age: 50
End: 2025-06-25
Payer: COMMERCIAL

## 2025-06-28 LAB
25(OH)D3+25(OH)D2 SERPL-MCNC: 44 NG/ML (ref 30–100)
ALBUMIN SERPL-MCNC: 4.1 G/DL (ref 3.6–5.1)
ALBUMIN/CREAT UR: NORMAL MG/G CREAT
APPEARANCE UR: CLEAR
BILIRUB UR QL STRIP: NEGATIVE
BUN SERPL-MCNC: 15 MG/DL (ref 7–25)
BUN/CREAT SERPL: 14 (CALC) (ref 6–22)
CALCIUM SERPL-MCNC: 9.6 MG/DL (ref 8.6–10.2)
CHLORIDE SERPL-SCNC: 103 MMOL/L (ref 98–110)
CO2 SERPL-SCNC: 27 MMOL/L (ref 20–32)
COLOR UR: YELLOW
CREAT SERPL-MCNC: 1.11 MG/DL (ref 0.5–0.99)
CREAT UR-MCNC: 42 MG/DL (ref 20–275)
CREAT UR-MCNC: 42 MG/DL (ref 20–275)
EGFRCR SERPLBLD CKD-EPI 2021: 61 ML/MIN/1.73M2
ERYTHROCYTE [DISTWIDTH] IN BLOOD BY AUTOMATED COUNT: 13.5 % (ref 11–15)
GLUCOSE SERPL-MCNC: 75 MG/DL (ref 65–99)
GLUCOSE UR QL STRIP: NEGATIVE
HCT VFR BLD AUTO: 45.6 % (ref 35–45)
HGB BLD-MCNC: 14.4 G/DL (ref 11.7–15.5)
HGB UR QL STRIP: NEGATIVE
KETONES UR QL STRIP: NEGATIVE
LEUKOCYTE ESTERASE UR QL STRIP: NEGATIVE
MCH RBC QN AUTO: 29.4 PG (ref 27–33)
MCHC RBC AUTO-ENTMCNC: 31.6 G/DL (ref 32–36)
MCV RBC AUTO: 93.1 FL (ref 80–100)
MICROALBUMIN UR-MCNC: <0.2 MG/DL
NITRITE UR QL STRIP: NEGATIVE
PH UR STRIP: 7 [PH] (ref 5–8)
PHOSPHATE SERPL-MCNC: 3.6 MG/DL (ref 2.5–4.5)
PLATELET # BLD AUTO: 288 THOUSAND/UL (ref 140–400)
PMV BLD REES-ECKER: 9.1 FL (ref 7.5–12.5)
POTASSIUM SERPL-SCNC: 4.9 MMOL/L (ref 3.5–5.3)
PROT UR QL STRIP: NEGATIVE
PROT UR-MCNC: <4 MG/DL (ref 5–24)
PROT/CREAT UR: ABNORMAL MG/G CREAT (ref 24–184)
PROT/CREAT UR: ABNORMAL MG/MG CREAT (ref 0.02–0.18)
PTH-INTACT SERPL-MCNC: 65 PG/ML (ref 16–77)
RBC # BLD AUTO: 4.9 MILLION/UL (ref 3.8–5.1)
SODIUM SERPL-SCNC: 142 MMOL/L (ref 135–146)
SP GR UR STRIP: 1.01 (ref 1–1.03)
WBC # BLD AUTO: 6.4 THOUSAND/UL (ref 3.8–10.8)

## 2025-07-02 ENCOUNTER — APPOINTMENT (OUTPATIENT)
Dept: DERMATOLOGY | Facility: CLINIC | Age: 50
End: 2025-07-02
Payer: COMMERCIAL

## 2025-07-07 ENCOUNTER — APPOINTMENT (OUTPATIENT)
Dept: NEPHROLOGY | Facility: CLINIC | Age: 50
End: 2025-07-07
Payer: COMMERCIAL

## 2025-07-07 DIAGNOSIS — N18.31 STAGE 3A CHRONIC KIDNEY DISEASE (MULTI): Primary | ICD-10-CM

## 2025-07-07 PROCEDURE — 99213 OFFICE O/P EST LOW 20 MIN: CPT | Performed by: INTERNAL MEDICINE

## 2025-07-07 PROCEDURE — 1036F TOBACCO NON-USER: CPT | Performed by: INTERNAL MEDICINE

## 2025-07-07 NOTE — PROGRESS NOTES
An interactive audio and video telecommunication system which permits real time communications between the patient (at the originating site) and provider (at the distant site) was utilized to provide this telehealth service.  Verbal consent was requested and obtained from patient on this date for a telehealth visit.    Chief Complaint: Follow up CKD    No specific complaints  Seeing genetics soon  Losing weight on mounjaro  Denies nausea, vomiting, chest pain, dyspnea  No urinary symptoms    NAD  Sclera AI s inj  MMM, no sores  Deferred secondary to COVID  No edema  No tremor  No rash    CKD stage 3a PKD  HTN well controlled per home readings (110-120s systolics)  Proteinuria, none significant  ADPKD via genetic testing     Genetics referral, following up next week  Follow up in neph clinic in one year  Labs and follow up in 6 months with clinical pharm

## 2025-07-08 ENCOUNTER — APPOINTMENT (OUTPATIENT)
Dept: GENETICS | Facility: CLINIC | Age: 50
End: 2025-07-08
Payer: COMMERCIAL

## 2025-07-08 DIAGNOSIS — Z14.8 HEMOCHROMATOSIS CARRIER: ICD-10-CM

## 2025-07-08 DIAGNOSIS — N18.31 STAGE 3A CHRONIC KIDNEY DISEASE (MULTI): Primary | ICD-10-CM

## 2025-07-08 DIAGNOSIS — N28.9 RENAL INSUFFICIENCY: ICD-10-CM

## 2025-07-08 DIAGNOSIS — R73.09 ELEVATED GLUCOSE: ICD-10-CM

## 2025-07-08 DIAGNOSIS — Z14.8 GENETIC CARRIER: ICD-10-CM

## 2025-07-08 DIAGNOSIS — E11.9 TYPE 2 DIABETES MELLITUS WITHOUT COMPLICATION, WITHOUT LONG-TERM CURRENT USE OF INSULIN: ICD-10-CM

## 2025-07-08 PROCEDURE — 99417 PROLNG OP E/M EACH 15 MIN: CPT | Performed by: STUDENT IN AN ORGANIZED HEALTH CARE EDUCATION/TRAINING PROGRAM

## 2025-07-08 PROCEDURE — 99205 OFFICE O/P NEW HI 60 MIN: CPT | Performed by: STUDENT IN AN ORGANIZED HEALTH CARE EDUCATION/TRAINING PROGRAM

## 2025-07-15 ENCOUNTER — APPOINTMENT (OUTPATIENT)
Dept: RADIOLOGY | Facility: CLINIC | Age: 50
End: 2025-07-15
Payer: COMMERCIAL

## 2025-07-15 NOTE — PROGRESS NOTES
"Basic info:  Name  Mirna De La Vega \"Mirna    Date of Birth  1975      Legal Sex  Female    MRN  [id number]    Requesting physician/service:  Reason for the visit / consultation:      Referred by: Dr. Debra Timmons   For:  Hypertensive kidney disease with stage 3a chronic kidney disease (Multi) and positive UHG585 [facility]asit kidney genetics panel.    History of Present Illness:    The patient presented to the clinic by herself    An interactive audio and video telecommunication system that permits real-time communications between the patient (at the originating site) and provider (at the distant site) was utilized to provide this telehealth service.  Verbal consent was requested and obtained for a telehealth visit.     Charlie De La Vega is a 48 y/o female with hypertensive kidney disease with stage 3a chronic kidney disease (Multi). She had past medical history of meningioma, s/p resection, epilepsy, elevated creatinine. She had elevated creatinine of  1.28. she was also noted to have elevated free kappa light chains. 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 She is here today after a referral from Dr. Timmons after a positive EST361 renasit kidney genetics panel. 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    [facility] Kidney gene panel (5/7/2024)    GAH182   Autosomal Dominant Polycystic Kidney disease- LAO706 (ADPKD-NFU078) (AD); SNJ554 - Related Recessive Ciliopathies (AR)  Inheritance: AD and AR  Variants: c.240G>A (p.Trp10*)  Heterozygous  Likely Pathogenic  APOL1  Susceptibility to End-stage renal disease and focal segmental glomerulosclerosis 4  Complex  C.1164_1169dol (p.xrg957_cey024gjv) (G2 allele)   Heterozygous   Risk Allele  QDL82A0   GitelmanSyndrome  Autosomal Recessive   ¢.2083+G>T (p.?)'Heterozygous   Pathogenic  VUS  VLGOO2W  Autosomal Dominant   c.6199G>A (p.Pgh4129Yjt)  Heterozygous   Unknown Significance    KiF7   KIF7-Related Ciliopathies   Autosomal Recessive   " C.3196G>C (p.Jed4882Buv)   Heterozygous Unknown   Significance    FANCA   Fanconl Anemia, Group A (AR)   Autosomal Recessive   ¢.3196 G>C  (p.Spp2897Obw)   Heterozygous  Unknown Significance    TGC7VG8  Wolcolt-Sailison Syndrome.   Autosomal Recessive   ¢.1717A>C (p.Zrq826PNu)   Heterozygous   Unknown Significance    WDR19  WDR19- Related conditions  AR  ¢.2198T>G (p.Vgq108PPJ)   Het  Unknown Significance    SLC4A4  Renal tubular acidosis Proximal, With ocular abnormalities  AutosomalRecessive  C.257 +6 G>T (p.?)  HET  Unknown Significance    HHM38U3  Thiamine-Responsive  Megaloblastic Anemia Syndrome   Autosomal Recessive:   ¢.991A>T (p.ujp982Jkr).   Heterozygous   Unknown Significance      Review of Systems:    Constitutional: No concerns reported  Head and Neck: No concerns reported other than meningioma s/p resection  Eyes:  No concerns reported  ENT:  No concerns reported other than ear pain, rhinorrhea, allergy seasonal  Respiratory:  No concerns reported other than allergy seasonal  Cardiovascular: not really an issue  Gastrointestinal:  Stomach discomfort, no organic reason found. Diabetes. No concerns reported  Genitourinary:  CKD stage 3a  Reproductive/Endocrine:  No concerns reported  Musculoskeletal:  No concerns reported  Hematology/Lymphatic:  hemochromatosis carrier  Skin: acne, eczema  Neurological:  No concerns reported other than seizures, dizziness, headaches --> meningioma s/p resection  Psychiatric: No concerns reported       Pertinent positive and negative ROS are listed in HPI, PMH and above, otherwise reviewed in detail for 15 systems and negative      Past Medical History:      Subacute ethmoidal sinusitis    -  Primary     Relevant Medications    amoxicillin-pot clavulanate (Augmentin) 875-125 mg tablet    brompheniramine-pseudoeph-DM (Bromfed DM) 2-30-10 mg/5 mL syrup    Environmental and seasonal allergies        Relevant Medications    brompheniramine-pseudoeph-DM (Bromfed DM) 2-30-10  mg/5 mL syrup    Antibiotic-induced yeast infection        Relevant Medications    fluconazole (Diflucan) 150 mg tablet      Past Surgical History:     meningioma brain tumor in left frontal lobe. Tumor   benign and removed,      Allergies:   Latex    Medications:    tirzepatide (Mounjaro) 5 mg/0.5 mL pen injector         Family History:    (Ref: scanned pedigree in the EMR)    Paternal ancestry: [place]  Maternal ancestry: [place]  Ashkenazi Religious ancestry: none    #First degree relatives:  - Father: , thymus cancer, leukemia  - Mother: 72y/o, DM2  - Full sibling: Brother has Hemochromatosis, astrocytoma  - Offspring: daughter    #Paternal relatives:   - Paternal grandfather:  at 70s, sepsis?  - Paternal grandmother:  of stroke, dementia    #Maternal relatives:  - Maternal grandfather:  at 60s of heart attack  - Maternal grandmother:  at 80s of DM2, dialysis    Other than the above, upon our specific inquiries, there is no report of family history of intellectual disability or learning disability, autism, birth defect, multiple miscarriage or stillbirth, infant or early childhood death, consanguinity, and other known genetic disease.     Social History:    Living with  and daughter  Occupation: clinical   Smoke? No        Examination:      Telemedicine Physical Exam:   General: No distress. Spontaneous movements. Appears well-developed and well-nourished.   Skin: No apparent rashes or jaundice  Hair: No abnormal distribution   Head Shape: normocephalic, atraumatic      Face:  no asymmetry for facial expression  Eyes:  No hypertelorism, no periorbital edema, no epicanthal folds. No discharge or swelling. No scleral icterus or injection.   Ears:  set and rotation within normal limit  Nose: no apparent dysmorphology found  Mouth: no microretrognathia  Neck:  no webbing   Chest: No respiratory distress.   Heart: No cyanosis  Extremities:  No evidence of joint  contractures or hypermobility.   Neurological: Good head control. Spontaneous movements of arms. No evidence of spasticity. There were no involuntary movements or tremor.  Psychiatric: Alert and awake. Oriented to person, place, and time. Normal mood and affect. Speech is normal and behavior is normal. Judgment and thought content normal.      Lab Results:   [facility]    GCX893   Autosomal Dominant Polycystic Kidney disease- ZDO852 (ADPKD-ADH203) (AD); XSN526 - Related Recessive Ciliopathies (AR)  Inheritance: AD and AR  Variants: c.240G>A (p.Trp10*)  Heterozygous  Likely Pathogenic  APOL1  Susceptibility to End-stage renal disease and focal segmental glomerulosclerosis 4  Complex  C.1164_1169dol (p.ktk470_ycp841ali) (G2 allele)   Heterozygous   Risk Allele  RZS12Q2   GitelmanSyndrome  Autosomal Recessive   ¢.2083+G>T (p.?)'Heterozygous   Pathogenic  VUS  UJKTF9W  Autosomal Dominant   c.6199G>A (p.Oih9986Txo)  Heterozygous   Unknown Significance    KiF7   KIF7-Related Ciliopathies   Autosomal Recessive   C.3196G>C (p.Kid1375Irl)   Heterozygous Unknown   Significance    FANCA   Fanconi Anemia, Group A (AR)   Autosomal Recessive   ¢.3196 G>C  (p.Nat4141Aet)   Heterozygous  Unknown Significance    SYQ8LY7  Wolcolt-Sailison Syndrome.   Autosomal Recessive   ¢.1717A>C (p.Ndp443HNo)   Heterozygous   Unknown Significance    WDR19  WDR19- Related conditions  AR  ¢.2198T>G (p.Wpv773CCL)   Het  Unknown Significance      SLC4A4  Renal tubular acidosis Proximal, With ocular abnormalities  AutosomalRecessive  C.257 +6 G>T (p.?)  HET  Unknown Significance    MDB09C1  Thiamine-Responsive  Megaloblastic Anemia Syndrome   Autosomal Recessive:   ¢.991A>T (p.kdz376Xlb).   Heterozygous   Unknown Significance  03/17/25 09:38  GLUCOSE: 69  SODIUM: 140  POTASSIUM: 4.6  CHLORIDE: 103  CARBON DIOXIDE: 29  ELECTROLYTE BALANCE: 8  UREA NITROGEN (BUN): 18  CREATININE: 1.19 (H)  EGFR: 57 (L)  CALCIUM:  9.3  ALBUMIN: 4.1  PROTEIN, TOTAL: 6.5  ALKALINE PHOSPHATASE: 56  ALT: 20  AST: 23  BILIRUBIN, TOTAL: 0.4  CREATINE KINASE, TOTAL: 193  WHITE BLOOD CELL COUNT: 7.9  RED BLOOD CELL COUNT: 5.04  HEMOGLOBIN: 14.3  HEMATOCRIT: 45.3 (H)  MCV: 89.9  MCH: 28.4  MCHC: 31.6 (L)  RDW: 13.0  PLATELET COUNT: 277  MPV: 9.1  ABSOLUTE NEUTROPHILS: 5,846  ABSOLUTE LYMPHOCYTES: 1,375  ABSOLUTE MONOCYTES: 600  ABSOLUTE EOSINOPHILS: 47  ABSOLUTE BASOPHILS: 32  NEUTROPHILS: 74  LYMPHOCYTES: 17.4  MONOCYTES: 7.6  EOSINOPHILS: 0.6  BASOPHILS: 0.4    (H): Data is abnormally high  (L): Data is abnormally low    02/20/25 14:51  GLUCOSE: 88  SODIUM: 141  POTASSIUM: 4.8  CHLORIDE: 103  Bicarbonate: 32  Anion Gap: 11  Blood Urea Nitrogen: 17  Creatinine: 1.28 (H)  EGFR: 52 (L)  Calcium: 9.5  Albumin: 4.4  Alkaline Phosphatase: 56  ALT: 20  AST: 20  Bilirubin Total: 0.4  FERRITIN: 52  Total Protein: 6.7    6.7  IRON: 50  TIBC: 292  % Saturation: 17 (L)  UIBC: 242  Alpha-Fetoprotein: <4  WBC: 8.4  nRBC: COMMENT ONLY  RBC: 5.15  HEMOGLOBIN: 14.9  HEMATOCRIT: 47.4 (H)  MCV: 92  MCH: 28.9  MCHC: 31.4 (L)  RED CELL DISTRIBUTION WIDTH: 14.0  Platelets: 278  Neutrophils %: 77.1  Immature Granulocytes %, Automated: 0.2  Lymphocytes %: 14.1  Monocytes %: 8.2  Eosinophils %: 0.2  Basophils %: 0.2  Neutrophils Absolute: 6.50  Immature Granulocytes Absolute, Automated: 0.02  Lymphocytes Absolute: 1.19 (L)  Monocytes Absolute: 0.69  Eosinophils Absolute: 0.02  Basophils Absolute: 0.02  Protein Electrophoresis Comment: Normal.  Albumin: 4.0  Alpha 1 Globulin: 0.3  Alpha 2 Globulin: 0.6  Gamma: 1.0  Beta Globulin: 0.7  Ig Kappa Free Light Chain: 1.97 (H)  Ig Lambda Free Light Chain: 0.78  Kappa/Lambda Ratio: 2.53 (H)  Immunofixation Comment: No monoclonal protein detected by immunofixation.  Path Review - Serum Immunofixation: Reviewed and approved by SAMMI DANIELLE on 2/24/25 at 8:20 PM.    Path Review - Serum Protein Electrophoresis : Reviewed and approved  by SAMMI DANIELLE on 2/24/25 at 8:20 PM.        (H): Data is abnormally high  (L): Data is abnormally low    Diagnostic Imaging:      STUDY:  US RENAL COMPLETE; 6/1/2024 10:30 am      INDICATION:  Stage IIIA chronic kidney disease      COMPARISON:  None.            IMPRESSION:  Normal ultrasound of the kidneys.      MACRO:  None.    DATE OF EXAM: Mar 14 2025  3:46PM      A1M   0295  -  MRI BRAIN WO/W IVCON  / ACCESSION #  [id number]     PROCEDURE REASON: Benign meningioma (HCC)          * * * * Physician Interpretation * * * *      EXAMINATION:  MRI BRAIN WO/W IVCON     CLINICAL HISTORY: Meningioma, status post resection     TECHNIQUE:  Routine brain MRI protocol without and with contrast   including diffusion images.   MQ:  MRBWOW_2     Contrast:  8.5 mL Elucirem IV     COMPARISON: MRI dated 02/20/2023     RESULT:     Acute Change: There is no evidence of restricted diffusion to suggest an   acute infarct.     Hemorrhage: Small amount of susceptibility artifact noted at the   craniotomy site and in the left frontal lobe, unchanged     Mass Lesion/ Mass Effect: Status post left frontal craniotomy with   gliosis in the underlying left frontal lobe.  Small amount of linear   enhancement in left frontal lobe is again noted and unchanged.  No   evidence of nodular enhancement to suggest recurrence.     Chronic Change: The white matter is within normal limits of signal   intensity for age.  No change in the presumed left cerebellar infarct.     Parenchyma: No significant volume loss for age.  The brain parenchyma is   otherwise within normal limits of signal intensity and morphology.     Ventricles: Normal caliber and morphology.     Skull Base: Hypothalamic and pituitary region are grossly normal.     Craniocervical junction is normal. No significant marrow replacement   process.     Vasculature: Major intracranial arterial structures, and dural venous   sinuses show typical flow void, suggesting patency by spin echo  criteria.     Other: The visualized paranasal sinuses and mastoid air cells are clear.    The orbits and extracranial soft tissues are unremarkable.         Assessment & Recommendations:     Mirna De La Vega is 49 y/o female:   -- Diabetes and Overweight on GLP-1, GIP agonist tirzepatide (Mounjaro)  -- Hypertensive kidney disease  -- CKD stage 3a  -- meningioma s/p resection  -- carrier for hemochromatosis  -- [facility] panel:   -IMZ704:  c.240G>A (p.Trp10*), Likely pathogenic, heterozygous, AD and AR for ADPKD  -APOL1: C.1164_1169dol (p.kxy557_mvc741som) (G2 allele), risk allele, susceptibility to ESRD  -FXC93W6: c.2083+G>T (p.?), pathogenic, heterozygous, AR for GitelmanSyndrome  -TXQCI0L: c.6199G>A (p.Tli8766Ewn), VUS, heterozygous, AD  -KIF7: C.3196G>C (p.Wdb1180Xff), VUS, Heterozygous, AR for KIF7-Related Ciliopathies  -FANCA: c.3196 G>C  (p.Pti1371Qqo), VUS, Heterozygous, Fanconi Anemia, Group A (AR)  -CNG7LM4: c.1717A>C (p.Ygp394GDu) , VUS, Heterozygous, AR for Wolcolt-Sailison Syndrome  -WDR19: c.2198T>G (p.Hjx710IWH), VUS, Heterozygous, AR for WDR19- Related conditions  -SLC4A4: c.257 +6 G>T (p.?), VUS, Heterozygous, AR for renal tubular acidosis Proximal, With ocular abnormalities  -GUJ11U9: c.991A>T (p.nts750Bhq), VUS, Heterozygous, AR for Thiamine-Responsive  Megaloblastic Anemia Syndrome    Recommendations:  #Counseling   1. JOHN of AD, AR  We went through detailed genetic counseling today.  Topics discussed included:  - The basics of genetics  - The distinction between monogenic disease-causing genes and risk genes  - The modes of inheritance (JOHN), including autosomal dominant (AD) and autosomal recessive (AR) patterns    Among all the genes listed in her report, she is heterozygous for each of the identified variants.  Only two of the genes--XDYOG7V and MPB697--ouhc autosomal dominant inheritance patterns.    2. NSXZT2J -- VUS  Her KBZXA4P variant is classified as a variant of uncertain  significance (VUS), so no clinical interpretation or action can be made based on it at this time.    3. VNN914 -- as an AD disease, mild ADPKD-like, incomplete penetrance, variable expressivity  Her OMV814 variant is also a heterozygous VUS. While WBX994 has been implicated in autosomal dominant polycystic kidney disease (ADPKD), this particular variant the patient carried has not been previously reported in the literature with any detailed clinical descriptions or associated phenotypic history.    However, based on published cases involving other heterozygous loss-of-function variants in IBT504, such individuals may present with a mild, ADPKD-like phenotype, characterized by large renal cysts, minimal kidney function decline, few liver cysts, and rarely progressing to end-stage renal disease. The penetrance is incomplete, and the expressivity is variable. This miler presentation and variability may explain why the patient currently shows no evidence of renal cysts on imaging studies.    In contrast, biallelic pathogenic variants in MCR134 are known to cause severe syndromic ciliopathies such as Mainzer-Saldino syndrome or Isaias thoracic dystrophy. This scenario does not apply to our patient, as she carries only a single heterozygous variant.  # Follow up  -- Please keep following up with nephrology (Dr. Timmons) for renal disease  -- For genetics, follow up as needed.   The patient does not need to be actively followed up with genetics, but if the patient has further questions or new concerns arise, we are happy to schedule another clinic visit.       Attending Physician Statement:        The history was reviewed with the family and is detailed above. I performed a physical examination which is documented above. The impression and plan were reviewed with the patient/family extensively and are documented above. I have reviewed and edited the above note. Greater than 50% of the time was spent on patient counseling  and coordination of care.    Prep 30 minutes  Face to face 63 minutes  Additional Patient Care Activities 0 minutes  Documentation 40 minutes  Other 0 minutes  Total 133 minutes    --  Angeli Aguayo MD, PhD  Director, Urogenetics Program, Department of Genetics and Genome Sciences  Chief, Urogenetics Division, [facility] [place]   and Attending in Genetics and Urology  [facility], and [facility]

## 2025-07-16 ENCOUNTER — APPOINTMENT (OUTPATIENT)
Dept: RADIOLOGY | Facility: CLINIC | Age: 50
End: 2025-07-16
Payer: COMMERCIAL

## 2025-07-16 ENCOUNTER — APPOINTMENT (OUTPATIENT)
Dept: PRIMARY CARE | Facility: CLINIC | Age: 50
End: 2025-07-16
Payer: COMMERCIAL

## 2025-07-16 DIAGNOSIS — K76.0 FATTY INFILTRATION OF LIVER: ICD-10-CM

## 2025-07-16 DIAGNOSIS — N18.31 STAGE 3A CHRONIC KIDNEY DISEASE (MULTI): ICD-10-CM

## 2025-07-16 DIAGNOSIS — E11.9 TYPE 2 DIABETES MELLITUS WITHOUT COMPLICATION, WITHOUT LONG-TERM CURRENT USE OF INSULIN: Primary | ICD-10-CM

## 2025-07-16 PROCEDURE — 99213 OFFICE O/P EST LOW 20 MIN: CPT | Performed by: INTERNAL MEDICINE

## 2025-07-16 PROCEDURE — 1036F TOBACCO NON-USER: CPT | Performed by: INTERNAL MEDICINE

## 2025-07-16 RX ORDER — TIRZEPATIDE 7.5 MG/.5ML
7.5 INJECTION, SOLUTION SUBCUTANEOUS WEEKLY
Qty: 2 ML | Refills: 3 | Status: SHIPPED | OUTPATIENT
Start: 2025-07-16 | End: 2026-07-16

## 2025-07-16 ASSESSMENT — ENCOUNTER SYMPTOMS
WEIGHT LOSS: 0
WEAKNESS: 0
BLURRED VISION: 0
HUNGER: 0
CONFUSION: 0
POLYPHAGIA: 0
SEIZURES: 0
BLACKOUTS: 0
NERVOUS/ANXIOUS: 0
TREMORS: 0
FATIGUE: 1
DIZZINESS: 0
HEADACHES: 1
SPEECH DIFFICULTY: 0
POLYDIPSIA: 0
VISUAL CHANGE: 0
SWEATS: 1

## 2025-07-16 NOTE — ASSESSMENT & PLAN NOTE
Orders:    Hemoglobin A1C; Future    Basic Metabolic Panel; Future    Lipid Panel; Future    tirzepatide (Mounjaro) 7.5 mg/0.5 mL pen injector; Inject 7.5 mg under the skin 1 (one) time per week.

## 2025-07-16 NOTE — PROGRESS NOTES
Subjective   Patient ID: Mirna De La Vega is a 50 y.o. female who presents for No chief complaint on file..    Diabetes  She has type 2 diabetes mellitus. No MedicAlert identification noted. The initial diagnosis of diabetes was made 1.5 years ago. Hypoglycemia symptoms include headaches and sweats. Pertinent negatives for hypoglycemia include no confusion, dizziness, hunger, mood changes, nervousness/anxiousness, pallor, seizures, sleepiness, speech difficulty or tremors. Associated symptoms include fatigue. Pertinent negatives for diabetes include no blurred vision, no chest pain, no foot paresthesias, no foot ulcerations, no polydipsia, no polyphagia, no polyuria, no visual change, no weakness and no weight loss. Pertinent negatives for hypoglycemia complications include no blackouts, no hospitalization, no nocturnal hypoglycemia, no required assistance and no required glucagon injection. Symptoms are stable. Diabetic complications include nephropathy. Pertinent negatives for diabetic complications include no CVA, heart disease, peripheral neuropathy, PVD or retinopathy. Risk factors for coronary artery disease include family history. Current diabetic treatment includes diet and oral agent (monotherapy). She is compliant with treatment all of the time. Her weight is stable. She is following a generally healthy diet. Meal planning includes avoidance of concentrated sweets. She has not had a previous visit with a dietitian. She participates in exercise daily. She monitors urine at home 1-2 x per week. Her home blood glucose trend is decreasing steadily. Her breakfast blood glucose is taken between 5-6 am. Her lunch blood glucose is taken between 12-1 pm. Her dinner blood glucose is taken between 6-7 pm. Her bedtime blood glucose is taken after 11 pm. She does not see a podiatrist.Eye exam is current.      Virtual or Telephone Consent    An interactive audio and video telecommunication system which permits real time  communications between the patient (at the originating site) and provider (at the distant site) was utilized to provide this telehealth service.   Verbal consent was requested and obtained from Mirna De La Vega on this date, 07/16/25 for a telehealth visit and the patient's location was confirmed at the time of the visit.    Overall doing very well.  On increased dose of Mounjaro without difficulty.  Weight has stabilized.  Working with nephrologist, all good.  Exercise daily.  Reducing sugar and carbs in her diet.  Review of Systems   Constitutional:  Positive for fatigue. Negative for weight loss.   Eyes:  Negative for blurred vision.   Cardiovascular:  Negative for chest pain.   Endocrine: Negative for polydipsia, polyphagia and polyuria.   Skin:  Negative for pallor.   Neurological:  Positive for headaches. Negative for dizziness, tremors, seizures, speech difficulty and weakness.   Psychiatric/Behavioral:  Negative for confusion. The patient is not nervous/anxious.        Objective   There were no vitals taken for this visit.    Physical Exam  Alert and oriented x 3.  Lab Results   Component Value Date    WBC 6.4 06/27/2025    HGB 14.4 06/27/2025    HCT 45.6 (H) 06/27/2025     06/27/2025    CHOL 155 11/14/2024    TRIG 68 11/14/2024    HDL 47.5 11/14/2024    ALT 20 03/17/2025    AST 23 03/17/2025     06/27/2025    K 4.9 06/27/2025     06/27/2025    CREATININE 1.11 (H) 06/27/2025    BUN 15 06/27/2025    CO2 27 06/27/2025    TSH 1.06 07/27/2023    HGBA1C 5.7 (H) 02/27/2025       Assessment/Plan   Assessment & Plan  Type 2 diabetes mellitus without complication, without long-term current use of insulin    Orders:  •  Hemoglobin A1C; Future  •  Basic Metabolic Panel; Future  •  Lipid Panel; Future  •  tirzepatide (Mounjaro) 7.5 mg/0.5 mL pen injector; Inject 7.5 mg under the skin 1 (one) time per week.    Stage 3a chronic kidney disease (Multi)    Orders:  •  Basic Metabolic Panel; Future  •   Basic Metabolic Panel; Future    Fatty infiltration of liver    Orders:  •  Alanine Aminotransferase; Future            #1 ckd3a-  (Genetics +PKD)  Stable.  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 retest iron next visit  #4 fhx DVT- father. s/p heme eval  #5 fatty liver- reviewed. diet/exercise. no EtOH   #6 dyspepsia- f/u  GI  #7  diabetes- doeing well.  A1c now.  UTD w/ Ophthalmology  (12/24--> no BDR).  Follow-up 4 months.  Will retry low-dose statin--> adr w/ atorvastatin. Will try pravachol 10 mg.    Will increase Mounjaro to 7.5  #8 h/o [id number] neurology  #9 light-chain- clinically stable.  f/u  heme.   #10 breast-follow-up gynecology.         Rec COVID booster

## 2025-07-23 DIAGNOSIS — E11.9 TYPE 2 DIABETES MELLITUS WITHOUT COMPLICATION, WITHOUT LONG-TERM CURRENT USE OF INSULIN: Primary | ICD-10-CM

## 2025-07-23 RX ORDER — ROSUVASTATIN CALCIUM 5 MG/1
2.5 TABLET, COATED ORAL DAILY
Qty: 100 TABLET | Refills: 3 | Status: SHIPPED | OUTPATIENT
Start: 2025-07-23 | End: 2026-07-23

## 2025-08-29 ENCOUNTER — APPOINTMENT (OUTPATIENT)
Dept: HEMATOLOGY/ONCOLOGY | Facility: CLINIC | Age: 50
End: 2025-08-29
Payer: COMMERCIAL

## 2025-09-04 ENCOUNTER — TELEPHONE (OUTPATIENT)
Dept: DERMATOLOGY | Facility: CLINIC | Age: 50
End: 2025-09-04
Payer: COMMERCIAL

## 2025-09-05 ENCOUNTER — OFFICE VISIT (OUTPATIENT)
Dept: OBSTETRICS AND GYNECOLOGY | Facility: CLINIC | Age: 50
End: 2025-09-05
Payer: COMMERCIAL

## 2025-09-05 ENCOUNTER — TELEPHONE (OUTPATIENT)
Dept: OBSTETRICS AND GYNECOLOGY | Facility: CLINIC | Age: 50
End: 2025-09-05

## 2025-09-05 VITALS
SYSTOLIC BLOOD PRESSURE: 110 MMHG | DIASTOLIC BLOOD PRESSURE: 80 MMHG | BODY MASS INDEX: 26.96 KG/M2 | WEIGHT: 182 LBS | HEIGHT: 69 IN

## 2025-09-05 DIAGNOSIS — N89.8 VAGINAL DISCHARGE: ICD-10-CM

## 2025-09-05 DIAGNOSIS — N89.8 VAGINAL IRRITATION: ICD-10-CM

## 2025-09-05 DIAGNOSIS — N90.89 IRRITATION OF VULVA: Primary | ICD-10-CM

## 2025-09-05 PROCEDURE — 3008F BODY MASS INDEX DOCD: CPT | Performed by: OBSTETRICS & GYNECOLOGY

## 2025-09-05 PROCEDURE — 3074F SYST BP LT 130 MM HG: CPT | Performed by: OBSTETRICS & GYNECOLOGY

## 2025-09-05 PROCEDURE — 3079F DIAST BP 80-89 MM HG: CPT | Performed by: OBSTETRICS & GYNECOLOGY

## 2025-09-05 PROCEDURE — 1036F TOBACCO NON-USER: CPT | Performed by: OBSTETRICS & GYNECOLOGY

## 2025-09-05 PROCEDURE — 99213 OFFICE O/P EST LOW 20 MIN: CPT | Performed by: OBSTETRICS & GYNECOLOGY

## 2025-09-05 SDOH — ECONOMIC STABILITY: FOOD INSECURITY: WITHIN THE PAST 12 MONTHS, YOU WORRIED THAT YOUR FOOD WOULD RUN OUT BEFORE YOU GOT MONEY TO BUY MORE.: NEVER TRUE

## 2025-09-05 SDOH — ECONOMIC STABILITY: FOOD INSECURITY: WITHIN THE PAST 12 MONTHS, THE FOOD YOU BOUGHT JUST DIDN'T LAST AND YOU DIDN'T HAVE MONEY TO GET MORE.: NEVER TRUE

## 2025-09-05 SDOH — ECONOMIC STABILITY: TRANSPORTATION INSECURITY
IN THE PAST 12 MONTHS, HAS THE LACK OF TRANSPORTATION KEPT YOU FROM MEDICAL APPOINTMENTS OR FROM GETTING MEDICATIONS?: NO

## 2025-09-05 SDOH — ECONOMIC STABILITY: TRANSPORTATION INSECURITY
IN THE PAST 12 MONTHS, HAS LACK OF TRANSPORTATION KEPT YOU FROM MEETINGS, WORK, OR FROM GETTING THINGS NEEDED FOR DAILY LIVING?: NO

## 2025-09-05 ASSESSMENT — ENCOUNTER SYMPTOMS
SORE THROAT: 0
BACK PAIN: 0
ABDOMINAL PAIN: 0
NAUSEA: 0
FEVER: 0
HEMATURIA: 0
DIARRHEA: 0
FLANK PAIN: 0
CHILLS: 0
CONSTIPATION: 1
ANOREXIA: 0
DYSURIA: 0
HEADACHES: 0
VOMITING: 0
FREQUENCY: 1

## 2025-09-06 LAB
BV SCORE VAG QL: NORMAL
C TRACH RRNA SPEC QL NAA+PROBE: NOT DETECTED
N GONORRHOEA RRNA SPEC QL NAA+PROBE: NOT DETECTED
QUEST GC CT AMPLIFIED (ALWAYS MESSAGE): NORMAL
T VAGINALIS RRNA SPEC QL NAA+PROBE: NOT DETECTED

## 2025-09-09 ENCOUNTER — APPOINTMENT (OUTPATIENT)
Dept: DERMATOLOGY | Facility: CLINIC | Age: 50
End: 2025-09-09
Payer: COMMERCIAL

## 2025-09-12 ENCOUNTER — APPOINTMENT (OUTPATIENT)
Dept: RADIOLOGY | Facility: CLINIC | Age: 50
End: 2025-09-12
Payer: COMMERCIAL

## 2025-09-19 ENCOUNTER — APPOINTMENT (OUTPATIENT)
Dept: HEMATOLOGY/ONCOLOGY | Facility: CLINIC | Age: 50
End: 2025-09-19
Payer: COMMERCIAL

## 2025-10-08 ENCOUNTER — APPOINTMENT (OUTPATIENT)
Dept: DERMATOLOGY | Facility: CLINIC | Age: 50
End: 2025-10-08
Payer: COMMERCIAL

## 2025-10-31 ENCOUNTER — APPOINTMENT (OUTPATIENT)
Dept: PODIATRY | Facility: CLINIC | Age: 50
End: 2025-10-31
Payer: COMMERCIAL

## 2025-12-12 ENCOUNTER — APPOINTMENT (OUTPATIENT)
Dept: PRIMARY CARE | Facility: CLINIC | Age: 50
End: 2025-12-12
Payer: COMMERCIAL

## 2026-01-26 ENCOUNTER — APPOINTMENT (OUTPATIENT)
Dept: OBSTETRICS AND GYNECOLOGY | Facility: CLINIC | Age: 51
End: 2026-01-26
Payer: COMMERCIAL

## 2026-07-06 ENCOUNTER — APPOINTMENT (OUTPATIENT)
Dept: NEPHROLOGY | Facility: CLINIC | Age: 51
End: 2026-07-06
Payer: COMMERCIAL